# Patient Record
Sex: MALE | Race: WHITE | Employment: OTHER | URBAN - METROPOLITAN AREA
[De-identification: names, ages, dates, MRNs, and addresses within clinical notes are randomized per-mention and may not be internally consistent; named-entity substitution may affect disease eponyms.]

---

## 2017-01-06 ENCOUNTER — LAB REQUISITION (OUTPATIENT)
Dept: LAB | Facility: HOSPITAL | Age: 67
End: 2017-01-06
Attending: INTERNAL MEDICINE
Payer: MEDICARE

## 2017-01-06 ENCOUNTER — SNF VISIT (OUTPATIENT)
Dept: INTERNAL MEDICINE CLINIC | Age: 67
End: 2017-01-06

## 2017-01-06 VITALS
DIASTOLIC BLOOD PRESSURE: 66 MMHG | HEART RATE: 68 BPM | TEMPERATURE: 100 F | OXYGEN SATURATION: 96 % | SYSTOLIC BLOOD PRESSURE: 138 MMHG | RESPIRATION RATE: 18 BRPM

## 2017-01-06 DIAGNOSIS — B99.9 INFECTIOUS DISEASE: ICD-10-CM

## 2017-01-06 DIAGNOSIS — G89.29 OTHER CHRONIC PAIN: Primary | ICD-10-CM

## 2017-01-06 DIAGNOSIS — G44.019 EPISODIC CLUSTER HEADACHE, NOT INTRACTABLE: ICD-10-CM

## 2017-01-06 LAB
ALBUMIN SERPL-MCNC: 2.7 G/DL (ref 3.5–4.8)
ALP LIVER SERPL-CCNC: 107 U/L (ref 45–117)
ALT SERPL-CCNC: 18 U/L (ref 17–63)
AST SERPL-CCNC: 19 U/L (ref 15–41)
BASOPHILS # BLD AUTO: 0.08 X10(3) UL (ref 0–0.1)
BASOPHILS NFR BLD AUTO: 0.9 %
BILIRUB SERPL-MCNC: 0.3 MG/DL (ref 0.1–2)
BUN BLD-MCNC: 26 MG/DL (ref 8–20)
C-REACTIVE PROTEIN: 1.72 MG/DL (ref ?–1)
CALCIUM BLD-MCNC: 8.5 MG/DL (ref 8.3–10.3)
CHLORIDE: 106 MMOL/L (ref 101–111)
CO2: 27 MMOL/L (ref 22–32)
CREAT BLD-MCNC: 0.91 MG/DL (ref 0.7–1.3)
EOSINOPHIL # BLD AUTO: 0.17 X10(3) UL (ref 0–0.3)
EOSINOPHIL NFR BLD AUTO: 1.9 %
ERYTHROCYTE [DISTWIDTH] IN BLOOD BY AUTOMATED COUNT: 14.1 % (ref 11.5–16)
GLUCOSE BLD-MCNC: 108 MG/DL (ref 70–99)
HCT VFR BLD AUTO: 30.8 % (ref 37–53)
HGB BLD-MCNC: 9.7 G/DL (ref 13–17)
IMMATURE GRANULOCYTE COUNT: 0.02 X10(3) UL (ref 0–1)
IMMATURE GRANULOCYTE RATIO %: 0.2 %
LYMPHOCYTES # BLD AUTO: 1.62 X10(3) UL (ref 0.9–4)
LYMPHOCYTES NFR BLD AUTO: 18.3 %
M PROTEIN MFR SERPL ELPH: 6.7 G/DL (ref 6.1–8.3)
MCH RBC QN AUTO: 27.7 PG (ref 27–33.2)
MCHC RBC AUTO-ENTMCNC: 31.5 G/DL (ref 31–37)
MCV RBC AUTO: 88 FL (ref 80–99)
MONOCYTES # BLD AUTO: 0.97 X10(3) UL (ref 0.1–0.6)
MONOCYTES NFR BLD AUTO: 11 %
NEUTROPHIL ABS PRELIM: 5.98 X10 (3) UL (ref 1.3–6.7)
NEUTROPHILS # BLD AUTO: 5.98 X10(3) UL (ref 1.3–6.7)
NEUTROPHILS NFR BLD AUTO: 67.7 %
PLATELET # BLD AUTO: 188 10(3)UL (ref 150–450)
POTASSIUM SERPL-SCNC: 4.5 MMOL/L (ref 3.6–5.1)
RBC # BLD AUTO: 3.5 X10(6)UL (ref 3.8–5.8)
RED CELL DISTRIBUTION WIDTH-SD: 44.9 FL (ref 35.1–46.3)
SED RATE-ML: 55 MM/HR (ref 0–12)
SODIUM SERPL-SCNC: 140 MMOL/L (ref 136–144)
WBC # BLD AUTO: 8.8 X10(3) UL (ref 4–13)

## 2017-01-06 PROCEDURE — 86140 C-REACTIVE PROTEIN: CPT | Performed by: INTERNAL MEDICINE

## 2017-01-06 PROCEDURE — 85025 COMPLETE CBC W/AUTO DIFF WBC: CPT | Performed by: INTERNAL MEDICINE

## 2017-01-06 PROCEDURE — 80053 COMPREHEN METABOLIC PANEL: CPT | Performed by: INTERNAL MEDICINE

## 2017-01-06 PROCEDURE — 85652 RBC SED RATE AUTOMATED: CPT | Performed by: INTERNAL MEDICINE

## 2017-01-06 PROCEDURE — 99309 SBSQ NF CARE MODERATE MDM 30: CPT | Performed by: NURSE PRACTITIONER

## 2017-01-06 RX ORDER — ACETAMINOPHEN 500 MG
1000 TABLET ORAL 2 TIMES DAILY PRN
COMMUNITY

## 2017-01-06 NOTE — PROGRESS NOTES
Yonatanme 63   : 12/10/1950  Age 77year old  male patient is admitted to [address] [place] [address] and [facility] for [facility] date:  16  Discharge date to [facility]:  17  TOM:  Unsure at this time/as there is neck.  His blood pressure has been well controlled.     He denies cp/sob/miller  Has h/o lung cancer/no coughing/no O2 requirements    No c/o radiating leg pains today  Has SCS still in place with eventual plan to explant the SCS 2/2 the infection found on MRI INJECTION;  Surgeon: Thao Orellana MD;  Location: [facility] FOR PAIN MANAGEMENT    PATIENT [address] FOR IV ANTIBIOTIC SURGICAL SITE INFECTION PROPHYLAXIS.  5/28/2013    Comment Procedure: CERVICAL FACET INJECTION;  Sur[facility]esar [facility]ora [facility] FOR IV ANTIBIOTIC SURGICAL SITE INFECTION PROPHYLAXIS.   9/30/2013    Comment Procedure: CERVICAL RADIOFREQUENCY;  Surgeon: Sera Dowell MD;  Location: [address] [place] [address]    PATIENT DOCUMENTED NOT TO HAVE [facility] Full Code    ADVANCED CARE PLANNING TEAM: None      CURRENT MEDICATIONS     Current Outpatient Prescriptions:  sodium chloride 0.9 % SOLN 100 mL with Micafungin Sodium 50 MG SOLR 100 mg Inject 100 mg into the vein daily.  Disp: 4200 mg Rfl: 0   Sodium Chlor by mouth 2 (two) times daily as needed for Anxiety. Disp: 60 tablet Rfl: 3   Atorvastatin Calcium 40 MG Oral Tab Take 40 mg by mouth nightly. Disp:  Rfl:    Multiple Vitamins-Minerals (ALIVE MENS ENERGY) Oral Tab Take 1 tablet by mouth daily.  Disp:  Rfl: seizures, denies vertigo, denies tinnitus and denies tremors decreased sensation to BLE  PSYCHE: --- no sx of depression/+ anxiety  HEMATOLOGY:denies hx anemia, denies bruising, denies excessive bleeding on lovenox currently  ENDOCRINE: denies excessive th abuse/and over use of tylenol and opiate medications/was working with [facility] pain clinic: and had been weaned off meds/but with reoccurrence of back pain/new MRI done of LSPINE showed ?  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 micafungin 100mg daily  2. Weekly cbc/cmp/esr/crp  3. Dr. Clara Gandhi to follow at [facility] 12 and upon DC  4. F/u with spine team: for explantation of SCS  5.  Work with SW for possible dc home: patient can do own Anti fungal/will h

## 2017-01-10 ENCOUNTER — SNF DISCHARGE (OUTPATIENT)
Dept: INTERNAL MEDICINE CLINIC | Age: 67
End: 2017-01-10

## 2017-01-10 VITALS
RESPIRATION RATE: 20 BRPM | SYSTOLIC BLOOD PRESSURE: 115 MMHG | HEART RATE: 68 BPM | TEMPERATURE: 98 F | OXYGEN SATURATION: 98 % | DIASTOLIC BLOOD PRESSURE: 54 MMHG

## 2017-01-10 DIAGNOSIS — M46.44 DISCITIS OF THORACIC REGION: ICD-10-CM

## 2017-01-10 DIAGNOSIS — G44.019 EPISODIC CLUSTER HEADACHE, NOT INTRACTABLE: ICD-10-CM

## 2017-01-10 DIAGNOSIS — G89.29 OTHER CHRONIC PAIN: Primary | ICD-10-CM

## 2017-01-10 PROCEDURE — 99316 NF DSCHRG MGMT 30 MIN+: CPT | Performed by: NURSE PRACTITIONER

## 2017-01-10 NOTE — PROGRESS NOTES
Floyd Amato., 12/10/1950, 77year old, male is being discharged from [address] [place] [address] in [place]       DISCHARGE SUMMARY    Date of Admission:1/5/17    Date of Discharge:patient requesting to leave on 1/13/17                                 Admitting Diag bicep/site non tender/no erythema/no drainage/hooked to IV antifungal at visit time  SKIN: no rashes, no suspicious lesions, pale, warm, dry  WOUND: no wound to review  EYES: PERRLA, EOMI, sclera anicteric, conjunctiva normal; there is no nystagmus, no natacha 10/13/2015    01/06/2017   K 4.5 01/06/2017    01/06/2017   CO2 27.0 01/06/2017         Discharge Diagnoses w/ current management:  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 micafungin 100mg daily: IVPB  2. Weekly cbc/cmp/esr/crp  3.  Dr. Jong Neri to fo

## 2017-01-13 ENCOUNTER — LAB REQUISITION (OUTPATIENT)
Dept: LAB | Facility: HOSPITAL | Age: 67
End: 2017-01-13
Attending: INTERNAL MEDICINE
Payer: MEDICARE

## 2017-01-13 DIAGNOSIS — M54.50 LOW BACK PAIN: ICD-10-CM

## 2017-01-13 LAB
APTT PPP: 36.7 SECONDS (ref 25–34)
INR BLD: 1.08 (ref 0.89–1.12)
PSA SERPL DL<=0.01 NG/ML-MCNC: 14.3 SECONDS (ref 12.3–14.8)

## 2017-01-13 PROCEDURE — 85610 PROTHROMBIN TIME: CPT | Performed by: INTERNAL MEDICINE

## 2017-01-13 PROCEDURE — 36415 COLL VENOUS BLD VENIPUNCTURE: CPT | Performed by: INTERNAL MEDICINE

## 2017-01-13 PROCEDURE — 85730 THROMBOPLASTIN TIME PARTIAL: CPT | Performed by: INTERNAL MEDICINE

## 2017-01-19 RX ORDER — FLUCONAZOLE 200 MG/1
200 TABLET ORAL DAILY
COMMUNITY
End: 2017-07-26 | Stop reason: ALTCHOICE

## 2017-01-20 ENCOUNTER — ANESTHESIA EVENT (OUTPATIENT)
Dept: SURGERY | Facility: HOSPITAL | Age: 67
End: 2017-01-20
Payer: MEDICARE

## 2017-01-25 ENCOUNTER — SURGERY (OUTPATIENT)
Age: 67
End: 2017-01-25

## 2017-01-25 ENCOUNTER — APPOINTMENT (OUTPATIENT)
Dept: GENERAL RADIOLOGY | Facility: HOSPITAL | Age: 67
End: 2017-01-25
Attending: PAIN MEDICINE
Payer: MEDICARE

## 2017-01-25 ENCOUNTER — ANESTHESIA (OUTPATIENT)
Dept: SURGERY | Facility: HOSPITAL | Age: 67
End: 2017-01-25
Payer: MEDICARE

## 2017-01-25 ENCOUNTER — HOSPITAL ENCOUNTER (OUTPATIENT)
Facility: HOSPITAL | Age: 67
Setting detail: HOSPITAL OUTPATIENT SURGERY
Discharge: HOME | End: 2017-01-25
Attending: PAIN MEDICINE | Admitting: PAIN MEDICINE
Payer: MEDICARE

## 2017-01-25 VITALS
SYSTOLIC BLOOD PRESSURE: 107 MMHG | HEIGHT: 66 IN | TEMPERATURE: 98 F | DIASTOLIC BLOOD PRESSURE: 93 MMHG | BODY MASS INDEX: 22.5 KG/M2 | RESPIRATION RATE: 16 BRPM | HEART RATE: 80 BPM | OXYGEN SATURATION: 99 % | WEIGHT: 140 LBS

## 2017-01-25 PROCEDURE — 76000 FLUOROSCOPY <1 HR PHYS/QHP: CPT

## 2017-01-25 PROCEDURE — 00PU0MZ REMOVAL OF NEUROSTIMULATOR LEAD FROM SPINAL CANAL, OPEN APPROACH: ICD-10-PCS | Performed by: PAIN MEDICINE

## 2017-01-25 PROCEDURE — 0JPT0MZ REMOVAL OF STIMULATOR GENERATOR FROM TRUNK SUBCUTANEOUS TISSUE AND FASCIA, OPEN APPROACH: ICD-10-PCS | Performed by: PAIN MEDICINE

## 2017-01-25 RX ORDER — BUPIVACAINE HYDROCHLORIDE 5 MG/ML
INJECTION, SOLUTION EPIDURAL; INTRACAUDAL AS NEEDED
Status: DISCONTINUED | OUTPATIENT
Start: 2017-01-25 | End: 2017-01-25 | Stop reason: HOSPADM

## 2017-01-25 RX ORDER — HYDROMORPHONE HYDROCHLORIDE 1 MG/ML
0.4 INJECTION, SOLUTION INTRAMUSCULAR; INTRAVENOUS; SUBCUTANEOUS EVERY 5 MIN PRN
Status: DISCONTINUED | OUTPATIENT
Start: 2017-01-25 | End: 2017-01-25

## 2017-01-25 RX ORDER — HYDROCODONE BITARTRATE AND ACETAMINOPHEN 5; 325 MG/1; MG/1
1 TABLET ORAL AS NEEDED
Status: DISCONTINUED | OUTPATIENT
Start: 2017-01-25 | End: 2017-01-25

## 2017-01-25 RX ORDER — MIDAZOLAM HYDROCHLORIDE 1 MG/ML
1 INJECTION INTRAMUSCULAR; INTRAVENOUS EVERY 5 MIN PRN
Status: DISCONTINUED | OUTPATIENT
Start: 2017-01-25 | End: 2017-01-25

## 2017-01-25 RX ORDER — TRAMADOL HYDROCHLORIDE 50 MG/1
100 TABLET ORAL EVERY 6 HOURS PRN
Status: CANCELLED | OUTPATIENT
Start: 2017-01-25

## 2017-01-25 RX ORDER — HYDROCODONE BITARTRATE AND ACETAMINOPHEN 5; 325 MG/1; MG/1
2 TABLET ORAL AS NEEDED
Status: DISCONTINUED | OUTPATIENT
Start: 2017-01-25 | End: 2017-01-25

## 2017-01-25 RX ORDER — ONDANSETRON 2 MG/ML
4 INJECTION INTRAMUSCULAR; INTRAVENOUS AS NEEDED
Status: DISCONTINUED | OUTPATIENT
Start: 2017-01-25 | End: 2017-01-25

## 2017-01-25 RX ORDER — LIDOCAINE HYDROCHLORIDE AND EPINEPHRINE 10; 10 MG/ML; UG/ML
INJECTION, SOLUTION INFILTRATION; PERINEURAL AS NEEDED
Status: DISCONTINUED | OUTPATIENT
Start: 2017-01-25 | End: 2017-01-25 | Stop reason: HOSPADM

## 2017-01-25 RX ORDER — NALOXONE HYDROCHLORIDE 0.4 MG/ML
80 INJECTION, SOLUTION INTRAMUSCULAR; INTRAVENOUS; SUBCUTANEOUS AS NEEDED
Status: DISCONTINUED | OUTPATIENT
Start: 2017-01-25 | End: 2017-01-25

## 2017-01-25 RX ORDER — SODIUM CHLORIDE, SODIUM LACTATE, POTASSIUM CHLORIDE, CALCIUM CHLORIDE 600; 310; 30; 20 MG/100ML; MG/100ML; MG/100ML; MG/100ML
INJECTION, SOLUTION INTRAVENOUS CONTINUOUS
Status: DISCONTINUED | OUTPATIENT
Start: 2017-01-25 | End: 2017-01-25

## 2017-01-25 NOTE — OPERATIVE REPORT
PATIENT NAME: Nikolai Hurley   : 12/10/1950  MRN: [id number]   DATE OF OPERATION: 2017      OPERATIVE REPORT   PREOPERATIVE DIAGNOSIS:   1. Chronic pain syndrome. 2. Post laminectomy syndrome. 3. Chronic lumbar radiculitis.    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 The incisions were dressed with Mastisol, Steri-Strips, sterile 4 x 4, and Tegaderm. The patient was brought to recovery room in stable condition. The patient tolerated the procedure well.

## 2017-01-25 NOTE — ANESTHESIA PREPROCEDURE EVALUATION
PRE-OP EVALUATION    Patient Name: Kenn Laws.     Pre-op Diagnosis: IDEOPATHIC PROGRESSIVE NEUROPATHY    Procedure(s):  SPINAL CORD STIMULATOR ELECTRODE AND BATTERY EXPLANT    Surgeon(s) and Role:     Kaitlin Lechuga MD - Primary    Pre-op vitals re TATYANA (15) on 11/25/2016 3:10:27 PM      Last ECHO  Summary:      1. Left ventricle: The cavity size is mildly increased. There is mild      concentric hypertrophy. Systolic function is moderately reduced.  The      estimated ejection fraction is 35-40%, lik Location: [facility] FOR PAIN MANAGEMENT    M-SEDAJ BY  PHYS [address] .[facility] 59  N [facility] 5+ YR  5/28/2013    Comment Procedure: CERVICAL FACET INJECTION;  Surgeon: Na Ozuna MD;  Location: [facility] FOR PAIN MANAGEMENT    PATIENT [facility] Comment Procedure: CERVICAL RADIOFREQUENCY;  Surgeon: Sera Dowell MD;  Location: [facility] FOR PAIN MANAGEMENT    PATIENT [address] FOR IV ANTIBIOTIC SURGICAL SITE INFECTION PROPHYLAXIS.   9/30/2013    Comment Procedure: CERVICAL RADI 01/06/2017   * 01/06/2017   CA 8.5 01/06/2017       Lab Results  Component Value Date   INR 1.08 01/13/2017         Airway      Mallampati: II  Mouth opening: 3 FB  TM distance: 4 - 6 cm  Neck ROM: full Cardiovascular    Cardiovascular exam normal.

## 2017-01-25 NOTE — ANESTHESIA POSTPROCEDURE EVALUATION
[facility].  Patient Status:  Hospital Outpatient Surgery   Age/Gender 77year old male MRN [id number]   [facility] SURGERY Attending Tamela Lechuga MD   Hosp Day # 0 PCP Enoch Neely MD       Anesthesia Post-op No

## 2017-01-30 PROBLEM — M47.816 LUMBAR SPONDYLOSIS: Status: ACTIVE | Noted: 2017-01-30

## 2017-02-22 ENCOUNTER — APPOINTMENT (OUTPATIENT)
Dept: CT IMAGING | Facility: HOSPITAL | Age: 67
End: 2017-02-22
Attending: EMERGENCY MEDICINE
Payer: MEDICARE

## 2017-02-22 ENCOUNTER — HOSPITAL ENCOUNTER (EMERGENCY)
Facility: HOSPITAL | Age: 67
Discharge: HOME | End: 2017-02-22
Attending: EMERGENCY MEDICINE
Payer: MEDICARE

## 2017-02-22 VITALS
HEIGHT: 66 IN | SYSTOLIC BLOOD PRESSURE: 124 MMHG | TEMPERATURE: 99 F | HEART RATE: 76 BPM | BODY MASS INDEX: 20.38 KG/M2 | WEIGHT: 126.81 LBS | RESPIRATION RATE: 18 BRPM | DIASTOLIC BLOOD PRESSURE: 78 MMHG | OXYGEN SATURATION: 100 %

## 2017-02-22 DIAGNOSIS — E86.0 DEHYDRATION: ICD-10-CM

## 2017-02-22 DIAGNOSIS — R42 DIZZINESS: Primary | ICD-10-CM

## 2017-02-22 LAB
ALBUMIN SERPL-MCNC: 3.4 G/DL (ref 3.5–4.8)
ALP LIVER SERPL-CCNC: 96 U/L (ref 45–117)
ALT SERPL-CCNC: 15 U/L (ref 17–63)
AST SERPL-CCNC: 16 U/L (ref 15–41)
BASOPHILS # BLD AUTO: 0.05 X10(3) UL (ref 0–0.1)
BASOPHILS NFR BLD AUTO: 0.7 %
BILIRUB SERPL-MCNC: 0.3 MG/DL (ref 0.1–2)
BILIRUB UR QL STRIP.AUTO: NEGATIVE
BUN BLD-MCNC: 27 MG/DL (ref 8–20)
CALCIUM BLD-MCNC: 9 MG/DL (ref 8.3–10.3)
CHLORIDE: 106 MMOL/L (ref 101–111)
CLARITY UR REFRACT.AUTO: CLEAR
CO2: 28 MMOL/L (ref 22–32)
CREAT BLD-MCNC: 1.39 MG/DL (ref 0.7–1.3)
EOSINOPHIL # BLD AUTO: 0.16 X10(3) UL (ref 0–0.3)
EOSINOPHIL NFR BLD AUTO: 2.2 %
ERYTHROCYTE [DISTWIDTH] IN BLOOD BY AUTOMATED COUNT: 15.4 % (ref 11.5–16)
GLUCOSE BLD-MCNC: 95 MG/DL (ref 70–99)
GLUCOSE BLD-MCNC: 97 MG/DL (ref 65–99)
GLUCOSE UR STRIP.AUTO-MCNC: NEGATIVE MG/DL
HCT VFR BLD AUTO: 40 % (ref 37–53)
HGB BLD-MCNC: 12.5 G/DL (ref 13–17)
IMMATURE GRANULOCYTE COUNT: 0.01 X10(3) UL (ref 0–1)
IMMATURE GRANULOCYTE RATIO %: 0.1 %
KETONES UR STRIP.AUTO-MCNC: NEGATIVE MG/DL
LEUKOCYTE ESTERASE UR QL STRIP.AUTO: NEGATIVE
LYMPHOCYTES # BLD AUTO: 2.23 X10(3) UL (ref 0.9–4)
LYMPHOCYTES NFR BLD AUTO: 30.4 %
M PROTEIN MFR SERPL ELPH: 7.2 G/DL (ref 6.1–8.3)
MCH RBC QN AUTO: 27.4 PG (ref 27–33.2)
MCHC RBC AUTO-ENTMCNC: 31.3 G/DL (ref 31–37)
MCV RBC AUTO: 87.7 FL (ref 80–99)
MONOCYTES # BLD AUTO: 0.61 X10(3) UL (ref 0.1–0.6)
MONOCYTES NFR BLD AUTO: 8.3 %
NEUTROPHIL ABS PRELIM: 4.27 X10 (3) UL (ref 1.3–6.7)
NEUTROPHILS # BLD AUTO: 4.27 X10(3) UL (ref 1.3–6.7)
NEUTROPHILS NFR BLD AUTO: 58.3 %
NITRITE UR QL STRIP.AUTO: NEGATIVE
PH UR STRIP.AUTO: 6 [PH] (ref 4.5–8)
PLATELET # BLD AUTO: 167 10(3)UL (ref 150–450)
POTASSIUM SERPL-SCNC: 4.2 MMOL/L (ref 3.6–5.1)
PROT UR STRIP.AUTO-MCNC: NEGATIVE MG/DL
RBC # BLD AUTO: 4.56 X10(6)UL (ref 3.8–5.8)
RBC UR QL AUTO: NEGATIVE
RED CELL DISTRIBUTION WIDTH-SD: 48.6 FL (ref 35.1–46.3)
SODIUM SERPL-SCNC: 140 MMOL/L (ref 136–144)
SP GR UR STRIP.AUTO: 1.01 (ref 1–1.03)
UROBILINOGEN UR STRIP.AUTO-MCNC: <2 MG/DL
WBC # BLD AUTO: 7.3 X10(3) UL (ref 4–13)

## 2017-02-22 PROCEDURE — 82962 GLUCOSE BLOOD TEST: CPT

## 2017-02-22 PROCEDURE — 80053 COMPREHEN METABOLIC PANEL: CPT

## 2017-02-22 PROCEDURE — 93010 ELECTROCARDIOGRAM REPORT: CPT

## 2017-02-22 PROCEDURE — 96360 HYDRATION IV INFUSION INIT: CPT

## 2017-02-22 PROCEDURE — 85025 COMPLETE CBC W/AUTO DIFF WBC: CPT

## 2017-02-22 PROCEDURE — 81003 URINALYSIS AUTO W/O SCOPE: CPT | Performed by: EMERGENCY MEDICINE

## 2017-02-22 PROCEDURE — 93005 ELECTROCARDIOGRAM TRACING: CPT

## 2017-02-22 PROCEDURE — 96361 HYDRATE IV INFUSION ADD-ON: CPT

## 2017-02-22 PROCEDURE — 99284 EMERGENCY DEPT VISIT MOD MDM: CPT

## 2017-02-22 PROCEDURE — 70450 CT HEAD/BRAIN W/O DYE: CPT

## 2017-02-22 PROCEDURE — 99285 EMERGENCY DEPT VISIT HI MDM: CPT

## 2017-02-22 RX ORDER — SODIUM CHLORIDE 9 MG/ML
INJECTION, SOLUTION INTRAVENOUS ONCE
Status: COMPLETED | OUTPATIENT
Start: 2017-02-22 | End: 2017-02-22

## 2017-02-22 NOTE — ED INITIAL ASSESSMENT (HPI)
Pt was at pmd's office and stated he felt lightheaded and eyes were feeling funny, has chronic foot pain and back pain

## 2017-02-23 LAB
ATRIAL RATE: 80 BPM
P AXIS: 56 DEGREES
P-R INTERVAL: 182 MS
Q-T INTERVAL: 390 MS
QRS DURATION: 86 MS
QTC CALCULATION (BEZET): 449 MS
R AXIS: -32 DEGREES
T AXIS: 84 DEGREES
VENTRICULAR RATE: 80 BPM

## 2017-02-23 NOTE — ED PROVIDER NOTES
Patient Seen in: [facility] Emergency Department    History   Patient presents with:  Dizziness (neurologic)    Stated Complaint: Lightheaded, dizziness    HPI    79-year-old male presents emergency room with chief complaint of episode of dizziness. 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   6/14/2013    Comment Procedure: CERVICAL FACET INJECTION;  Surgeon: Gracy Elliott MD;  Location: [facility] FOR PAIN MANAGEMENT    PATIENT DOCUMENTED NOT TO HAVE EXPERIENCED ANY OF THE FOLLOWING EVENTS  6/14/2013    Comment Procedure: [facility]    TONSILLECTOMY      COLONOSCOPY         Medications :   lisinopril 10 MG Oral Tab,  Take 1 tablet (10 mg total) by mouth daily. alprazolam 0.5 MG Oral Tab,  Take 1 tablet (0.5 mg total) by mouth daily.    TraMADol HCl 50 M Current Every Day Smoker        Packs/Day: 0.40  Years: 35        Types: Cigarettes    Smokeless Status: Never Used                        Alcohol Use: No                 Comment: h/o ETOH abuse--none since 1992      Review of Systems    Positive for state midline, no facial drooping, no ptosis, muscle strength +5/5 bilateral upper and lower extremities cerebellar finger to nose intact, no pronator drift, sensation intact.   Steady gait with walker        ED Course     Labs Reviewed   COMP METABOLIC PANEL (14 was clinically dehydrated and did receive IV fluids, and reevaluation mucous murmurs are moist.  Patient does have slightly elevated creatinine was instructed to follow-up with primary care for reevaluation.   Return to ER if any change worsening symptoms a

## 2017-03-23 PROBLEM — M47.816 LUMBAR SPONDYLOSIS: Status: RESOLVED | Noted: 2017-01-30 | Resolved: 2017-03-23

## 2017-03-31 PROBLEM — J84.10 PULMONARY FIBROSIS (HCC): Status: ACTIVE | Noted: 2017-03-31

## 2017-03-31 PROBLEM — I70.8 AORTO-ILIAC ATHEROSCLEROSIS (HCC): Status: ACTIVE | Noted: 2017-03-31

## 2017-03-31 PROBLEM — I70.0 AORTO-ILIAC ATHEROSCLEROSIS (HCC): Status: ACTIVE | Noted: 2017-03-31

## 2017-03-31 PROBLEM — I77.819 AORTIC ECTASIA (HCC): Status: ACTIVE | Noted: 2017-03-31

## 2017-04-19 PROCEDURE — 86803 HEPATITIS C AB TEST: CPT | Performed by: INTERNAL MEDICINE

## 2018-02-14 PROBLEM — I26.99 OTHER PULMONARY EMBOLISM WITHOUT ACUTE COR PULMONALE, UNSPECIFIED CHRONICITY (HCC): Status: ACTIVE | Noted: 2018-02-14

## 2018-02-14 PROBLEM — I48.0 PAF (PAROXYSMAL ATRIAL FIBRILLATION) (HCC): Status: ACTIVE | Noted: 2018-02-14

## 2018-02-14 PROBLEM — E08.40: Status: RESOLVED | Noted: 2018-02-14 | Resolved: 2018-02-14

## 2018-02-14 PROBLEM — E08.40: Status: ACTIVE | Noted: 2018-02-14

## 2018-02-14 PROBLEM — J44.9 CHRONIC OBSTRUCTIVE PULMONARY DISEASE, UNSPECIFIED COPD TYPE (HCC): Status: ACTIVE | Noted: 2018-02-14

## 2018-03-02 PROBLEM — I50.22 CHRONIC SYSTOLIC CHF (CONGESTIVE HEART FAILURE) (HCC): Status: ACTIVE | Noted: 2018-03-02

## 2018-03-02 PROBLEM — F10.20 ALCOHOLIC (HCC): Status: ACTIVE | Noted: 2018-03-02

## 2018-03-02 PROBLEM — R09.89 BILATERAL CAROTID BRUITS: Status: ACTIVE | Noted: 2018-03-02

## 2018-03-07 PROBLEM — M54.12 CERVICAL RADICULITIS: Status: ACTIVE | Noted: 2018-03-07

## 2018-04-02 PROBLEM — I77.9 BILATERAL CAROTID ARTERY DISEASE (HCC): Status: ACTIVE | Noted: 2018-04-02

## 2018-04-02 PROCEDURE — 36415 COLL VENOUS BLD VENIPUNCTURE: CPT | Performed by: INTERNAL MEDICINE

## 2018-04-02 PROCEDURE — 82607 VITAMIN B-12: CPT | Performed by: INTERNAL MEDICINE

## 2018-05-08 ENCOUNTER — ANESTHESIA (OUTPATIENT)
Dept: SURGERY | Facility: HOSPITAL | Age: 68
End: 2018-05-08

## 2018-05-08 ENCOUNTER — SURGERY (OUTPATIENT)
Age: 68
End: 2018-05-08

## 2018-05-08 ENCOUNTER — APPOINTMENT (OUTPATIENT)
Dept: CT IMAGING | Facility: HOSPITAL | Age: 68
DRG: 853 | End: 2018-05-08
Attending: EMERGENCY MEDICINE
Payer: MEDICARE

## 2018-05-08 ENCOUNTER — HOSPITAL ENCOUNTER (INPATIENT)
Facility: HOSPITAL | Age: 68
LOS: 7 days | Discharge: HOME HEALTH | DRG: 853 | End: 2018-05-15
Attending: EMERGENCY MEDICINE | Admitting: SURGERY
Payer: MEDICARE

## 2018-05-08 ENCOUNTER — ANESTHESIA EVENT (OUTPATIENT)
Dept: SURGERY | Facility: HOSPITAL | Age: 68
End: 2018-05-08

## 2018-05-08 ENCOUNTER — APPOINTMENT (OUTPATIENT)
Dept: GENERAL RADIOLOGY | Facility: HOSPITAL | Age: 68
DRG: 853 | End: 2018-05-08
Attending: EMERGENCY MEDICINE
Payer: MEDICARE

## 2018-05-08 DIAGNOSIS — R19.8 PERFORATED VISCUS: Primary | ICD-10-CM

## 2018-05-08 DIAGNOSIS — K25.5 PERFORATED STOMACH (HCC): ICD-10-CM

## 2018-05-08 PROBLEM — N17.9 ACUTE KIDNEY INJURY (HCC): Status: ACTIVE | Noted: 2018-05-08

## 2018-05-08 PROBLEM — R73.9 HYPERGLYCEMIA: Status: ACTIVE | Noted: 2018-05-08

## 2018-05-08 PROBLEM — D72.829 LEUKOCYTOSIS: Status: ACTIVE | Noted: 2018-05-08

## 2018-05-08 PROCEDURE — 93005 ELECTROCARDIOGRAM TRACING: CPT

## 2018-05-08 PROCEDURE — 84132 ASSAY OF SERUM POTASSIUM: CPT | Performed by: NURSE PRACTITIONER

## 2018-05-08 PROCEDURE — 80053 COMPREHEN METABOLIC PANEL: CPT | Performed by: EMERGENCY MEDICINE

## 2018-05-08 PROCEDURE — 96361 HYDRATE IV INFUSION ADD-ON: CPT

## 2018-05-08 PROCEDURE — 87081 CULTURE SCREEN ONLY: CPT | Performed by: HOSPITALIST

## 2018-05-08 PROCEDURE — 87205 SMEAR GRAM STAIN: CPT | Performed by: SURGERY

## 2018-05-08 PROCEDURE — 86900 BLOOD TYPING SEROLOGIC ABO: CPT | Performed by: EMERGENCY MEDICINE

## 2018-05-08 PROCEDURE — 0DU607Z SUPPLEMENT STOMACH WITH AUTOLOGOUS TISSUE SUBSTITUTE, OPEN APPROACH: ICD-10-PCS | Performed by: SURGERY

## 2018-05-08 PROCEDURE — 87147 CULTURE TYPE IMMUNOLOGIC: CPT | Performed by: SURGERY

## 2018-05-08 PROCEDURE — 85610 PROTHROMBIN TIME: CPT | Performed by: EMERGENCY MEDICINE

## 2018-05-08 PROCEDURE — 86901 BLOOD TYPING SEROLOGIC RH(D): CPT | Performed by: EMERGENCY MEDICINE

## 2018-05-08 PROCEDURE — 87075 CULTR BACTERIA EXCEPT BLOOD: CPT | Performed by: SURGERY

## 2018-05-08 PROCEDURE — 86850 RBC ANTIBODY SCREEN: CPT | Performed by: EMERGENCY MEDICINE

## 2018-05-08 PROCEDURE — 81001 URINALYSIS AUTO W/SCOPE: CPT | Performed by: EMERGENCY MEDICINE

## 2018-05-08 PROCEDURE — 87186 SC STD MICRODIL/AGAR DIL: CPT | Performed by: SURGERY

## 2018-05-08 PROCEDURE — 85730 THROMBOPLASTIN TIME PARTIAL: CPT | Performed by: EMERGENCY MEDICINE

## 2018-05-08 PROCEDURE — 99285 EMERGENCY DEPT VISIT HI MDM: CPT

## 2018-05-08 PROCEDURE — 85025 COMPLETE CBC W/AUTO DIFF WBC: CPT | Performed by: EMERGENCY MEDICINE

## 2018-05-08 PROCEDURE — 99291 CRITICAL CARE FIRST HOUR: CPT

## 2018-05-08 PROCEDURE — 3E0T3BZ INTRODUCTION OF ANESTHETIC AGENT INTO PERIPHERAL NERVES AND PLEXI, PERCUTANEOUS APPROACH: ICD-10-PCS | Performed by: SURGERY

## 2018-05-08 PROCEDURE — 83605 ASSAY OF LACTIC ACID: CPT | Performed by: NURSE PRACTITIONER

## 2018-05-08 PROCEDURE — 83605 ASSAY OF LACTIC ACID: CPT | Performed by: EMERGENCY MEDICINE

## 2018-05-08 PROCEDURE — 87077 CULTURE AEROBIC IDENTIFY: CPT | Performed by: SURGERY

## 2018-05-08 PROCEDURE — 96376 TX/PRO/DX INJ SAME DRUG ADON: CPT

## 2018-05-08 PROCEDURE — 71045 X-RAY EXAM CHEST 1 VIEW: CPT | Performed by: EMERGENCY MEDICINE

## 2018-05-08 PROCEDURE — S0028 INJECTION, FAMOTIDINE, 20 MG: HCPCS | Performed by: SURGERY

## 2018-05-08 PROCEDURE — 96375 TX/PRO/DX INJ NEW DRUG ADDON: CPT

## 2018-05-08 PROCEDURE — 93010 ELECTROCARDIOGRAM REPORT: CPT

## 2018-05-08 PROCEDURE — 82962 GLUCOSE BLOOD TEST: CPT

## 2018-05-08 PROCEDURE — 83690 ASSAY OF LIPASE: CPT | Performed by: EMERGENCY MEDICINE

## 2018-05-08 PROCEDURE — 85027 COMPLETE CBC AUTOMATED: CPT | Performed by: NURSE PRACTITIONER

## 2018-05-08 PROCEDURE — 74176 CT ABD & PELVIS W/O CONTRAST: CPT | Performed by: EMERGENCY MEDICINE

## 2018-05-08 PROCEDURE — 87070 CULTURE OTHR SPECIMN AEROBIC: CPT | Performed by: SURGERY

## 2018-05-08 PROCEDURE — 96365 THER/PROPH/DIAG IV INF INIT: CPT

## 2018-05-08 PROCEDURE — 93010 ELECTROCARDIOGRAM REPORT: CPT | Performed by: INTERNAL MEDICINE

## 2018-05-08 RX ORDER — NICOTINE 21 MG/24HR
1 PATCH, TRANSDERMAL 24 HOURS TRANSDERMAL DAILY
Status: DISCONTINUED | OUTPATIENT
Start: 2018-05-08 | End: 2018-05-15

## 2018-05-08 RX ORDER — ONDANSETRON 2 MG/ML
4 INJECTION INTRAMUSCULAR; INTRAVENOUS EVERY 6 HOURS PRN
Status: DISCONTINUED | OUTPATIENT
Start: 2018-05-08 | End: 2018-05-15

## 2018-05-08 RX ORDER — HYDROMORPHONE HYDROCHLORIDE 1 MG/ML
0.2 INJECTION, SOLUTION INTRAMUSCULAR; INTRAVENOUS; SUBCUTANEOUS EVERY 2 HOUR PRN
Status: DISCONTINUED | OUTPATIENT
Start: 2018-05-08 | End: 2018-05-15

## 2018-05-08 RX ORDER — SODIUM CHLORIDE, SODIUM LACTATE, POTASSIUM CHLORIDE, CALCIUM CHLORIDE 600; 310; 30; 20 MG/100ML; MG/100ML; MG/100ML; MG/100ML
INJECTION, SOLUTION INTRAVENOUS CONTINUOUS
Status: DISCONTINUED | OUTPATIENT
Start: 2018-05-08 | End: 2018-05-08 | Stop reason: HOSPADM

## 2018-05-08 RX ORDER — NALOXONE HYDROCHLORIDE 0.4 MG/ML
80 INJECTION, SOLUTION INTRAMUSCULAR; INTRAVENOUS; SUBCUTANEOUS AS NEEDED
Status: DISCONTINUED | OUTPATIENT
Start: 2018-05-08 | End: 2018-05-08 | Stop reason: HOSPADM

## 2018-05-08 RX ORDER — ONDANSETRON 2 MG/ML
4 INJECTION INTRAMUSCULAR; INTRAVENOUS ONCE
Status: COMPLETED | OUTPATIENT
Start: 2018-05-08 | End: 2018-05-08

## 2018-05-08 RX ORDER — SODIUM CHLORIDE 9 MG/ML
1000 INJECTION, SOLUTION INTRAVENOUS CONTINUOUS
Status: DISCONTINUED | OUTPATIENT
Start: 2018-05-08 | End: 2018-05-08

## 2018-05-08 RX ORDER — LIDOCAINE HYDROCHLORIDE AND EPINEPHRINE 10; 10 MG/ML; UG/ML
INJECTION, SOLUTION INFILTRATION; PERINEURAL AS NEEDED
Status: DISCONTINUED | OUTPATIENT
Start: 2018-05-08 | End: 2018-05-08 | Stop reason: HOSPADM

## 2018-05-08 RX ORDER — DEXTROSE, SODIUM CHLORIDE, SODIUM LACTATE, POTASSIUM CHLORIDE, AND CALCIUM CHLORIDE 5; .6; .31; .03; .02 G/100ML; G/100ML; G/100ML; G/100ML; G/100ML
INJECTION, SOLUTION INTRAVENOUS CONTINUOUS
Status: DISCONTINUED | OUTPATIENT
Start: 2018-05-08 | End: 2018-05-08

## 2018-05-08 RX ORDER — DEXTROSE MONOHYDRATE 25 G/50ML
50 INJECTION, SOLUTION INTRAVENOUS ONCE
Status: COMPLETED | OUTPATIENT
Start: 2018-05-08 | End: 2018-05-08

## 2018-05-08 RX ORDER — DEXTROSE, SODIUM CHLORIDE, SODIUM LACTATE, POTASSIUM CHLORIDE, AND CALCIUM CHLORIDE 5; .6; .31; .03; .02 G/100ML; G/100ML; G/100ML; G/100ML; G/100ML
INJECTION, SOLUTION INTRAVENOUS CONTINUOUS
Status: DISCONTINUED | OUTPATIENT
Start: 2018-05-08 | End: 2018-05-13

## 2018-05-08 RX ORDER — DEXTROSE MONOHYDRATE 100 MG/ML
INJECTION, SOLUTION INTRAVENOUS CONTINUOUS
Status: DISCONTINUED | OUTPATIENT
Start: 2018-05-08 | End: 2018-05-13

## 2018-05-08 RX ORDER — CEFOXITIN 2 G/1
INJECTION, POWDER, FOR SOLUTION INTRAVENOUS
Status: DISCONTINUED | OUTPATIENT
Start: 2018-05-08 | End: 2018-05-08 | Stop reason: HOSPADM

## 2018-05-08 RX ORDER — FAMOTIDINE 10 MG/ML
20 INJECTION, SOLUTION INTRAVENOUS DAILY
Status: DISCONTINUED | OUTPATIENT
Start: 2018-05-08 | End: 2018-05-10

## 2018-05-08 RX ORDER — HYDROMORPHONE HYDROCHLORIDE 1 MG/ML
1 INJECTION, SOLUTION INTRAMUSCULAR; INTRAVENOUS; SUBCUTANEOUS EVERY 30 MIN PRN
Status: DISCONTINUED | OUTPATIENT
Start: 2018-05-08 | End: 2018-05-15

## 2018-05-08 RX ORDER — HYDROMORPHONE HYDROCHLORIDE 1 MG/ML
0.8 INJECTION, SOLUTION INTRAMUSCULAR; INTRAVENOUS; SUBCUTANEOUS EVERY 2 HOUR PRN
Status: DISCONTINUED | OUTPATIENT
Start: 2018-05-08 | End: 2018-05-15

## 2018-05-08 RX ORDER — BUPIVACAINE HYDROCHLORIDE 5 MG/ML
INJECTION, SOLUTION EPIDURAL; INTRACAUDAL AS NEEDED
Status: DISCONTINUED | OUTPATIENT
Start: 2018-05-08 | End: 2018-05-08 | Stop reason: HOSPADM

## 2018-05-08 RX ORDER — DEXTROSE MONOHYDRATE 25 G/50ML
50 INJECTION, SOLUTION INTRAVENOUS
Status: DISCONTINUED | OUTPATIENT
Start: 2018-05-08 | End: 2018-05-15

## 2018-05-08 RX ORDER — ONDANSETRON 2 MG/ML
4 INJECTION INTRAMUSCULAR; INTRAVENOUS AS NEEDED
Status: DISCONTINUED | OUTPATIENT
Start: 2018-05-08 | End: 2018-05-08 | Stop reason: HOSPADM

## 2018-05-08 RX ORDER — IPRATROPIUM BROMIDE AND ALBUTEROL SULFATE 2.5; .5 MG/3ML; MG/3ML
3 SOLUTION RESPIRATORY (INHALATION) EVERY 6 HOURS PRN
Status: DISCONTINUED | OUTPATIENT
Start: 2018-05-08 | End: 2018-05-15

## 2018-05-08 RX ORDER — MIDAZOLAM HYDROCHLORIDE 1 MG/ML
1 INJECTION INTRAMUSCULAR; INTRAVENOUS EVERY 5 MIN PRN
Status: DISCONTINUED | OUTPATIENT
Start: 2018-05-08 | End: 2018-05-08 | Stop reason: HOSPADM

## 2018-05-08 RX ORDER — HYDROMORPHONE HYDROCHLORIDE 1 MG/ML
0.4 INJECTION, SOLUTION INTRAMUSCULAR; INTRAVENOUS; SUBCUTANEOUS EVERY 5 MIN PRN
Status: DISCONTINUED | OUTPATIENT
Start: 2018-05-08 | End: 2018-05-08 | Stop reason: HOSPADM

## 2018-05-08 RX ORDER — FAMOTIDINE 20 MG/1
20 TABLET ORAL DAILY
Status: DISCONTINUED | OUTPATIENT
Start: 2018-05-08 | End: 2018-05-10

## 2018-05-08 RX ORDER — HEPARIN SODIUM 5000 [USP'U]/ML
5000 INJECTION, SOLUTION INTRAVENOUS; SUBCUTANEOUS EVERY 12 HOURS SCHEDULED
Status: DISCONTINUED | OUTPATIENT
Start: 2018-05-09 | End: 2018-05-15

## 2018-05-08 RX ORDER — DEXTROSE MONOHYDRATE 25 G/50ML
INJECTION, SOLUTION INTRAVENOUS
Status: COMPLETED
Start: 2018-05-08 | End: 2018-05-08

## 2018-05-08 RX ORDER — MEPERIDINE HYDROCHLORIDE 25 MG/ML
12.5 INJECTION INTRAMUSCULAR; INTRAVENOUS; SUBCUTANEOUS AS NEEDED
Status: DISCONTINUED | OUTPATIENT
Start: 2018-05-08 | End: 2018-05-08 | Stop reason: HOSPADM

## 2018-05-08 RX ORDER — DEXTROSE MONOHYDRATE 25 G/50ML
50 INJECTION, SOLUTION INTRAVENOUS
Status: DISCONTINUED | OUTPATIENT
Start: 2018-05-08 | End: 2018-05-08 | Stop reason: HOSPADM

## 2018-05-08 RX ORDER — HYDROMORPHONE HYDROCHLORIDE 1 MG/ML
0.4 INJECTION, SOLUTION INTRAMUSCULAR; INTRAVENOUS; SUBCUTANEOUS EVERY 2 HOUR PRN
Status: DISCONTINUED | OUTPATIENT
Start: 2018-05-08 | End: 2018-05-15

## 2018-05-08 NOTE — PROGRESS NOTES
05/08/18 1753   Clinical Encounter Type   Visited With Patient; Health care provider  (RN)   Routine Visit Introduction   Continue Visiting No   Patient's Supportive Strategies/Resources ( acknowledged patient's situation regarding HCPOA.)   Jen

## 2018-05-08 NOTE — ED PROVIDER NOTES
Patient Seen in: [facility] Emergency Department    History   Patient presents with:  Abdomen/Flank Pain (GI/)    Stated Complaint: arrived via EMS for c/o abd pain with n/v/d    HPI    Patient is a 42-year-old male comes in emergency room for mark PAIN MANAGEMENT  9/30/2013: DESTROY CERV/THOR FACET JNT      Comment: Procedure: CERVICAL RADIOFREQUENCY;  Surgeon:                Sera Dowell MD;  Location: [address] [place] [address] Drive MANAGEMENT  5/28/2013: Fercho HERNANDEZ/ Comment: Procedure: CERVICAL FACET INJECTION;  Surgeon:               Henrry Llamas MD;  Location: [place] [address] MANAGEMENT  6/14/2013: PATIENT DOCUMENTED NOT TO HAVE EXPERIENCED ANY*      Comment: Procedure: CERVICAL FACET INJECTION 1992      Review of Systems    Positive for stated complaint: arrived via EMS for c/o abd pain with n/v/d  Other systems are as noted in HPI. Constitutional and vital signs reviewed. All other systems reviewed and negative except as noted above.     P All other components within normal limits   CBC W/ DIFFERENTIAL - Abnormal; Notable for the following:     WBC 17.3 (*)     Neutrophil Absolute Prelim 15.83 (*)     Neutrophil Absolute 15.83 (*)     Lymphocyte Absolute 0.55 (*)     All other components pooling subjacent to the anterior abdominal wall, likely within the greater sac. Perforation is felt to most likely be arising from the anterior, inferior margin of the stomach along the greater curvature. Would recommend emergent surgical consultation. Obedchrista  medical group hospitalist        [facility]      Sepsis Reassessment Note    /59   Pulse 77   Temp 97.7 °F (36.5 °C) (Temporal)   Resp 16   Ht 167.6 cm (5' 6\")   Wt 63.5 kg   SpO2 100%   BMI 22.60 kg/m²      6:32 AM    [place]

## 2018-05-08 NOTE — H&P
History & Physical Examination    Patient Name: Lion Song.   MRN: [id number]  [facility]: [id number]  YOB: 1950    Diagnosis: perforation ,peritnoitis        Prescriptions Prior to Admission:  lisinopril 10 MG Oral Tab TAKE 1 TABLET (10 MG TOTA Allergies:   No Known Allergies          Past Medical History:   Diagnosis Date   • Anxiety state, unspecified    • Blind     right eye   • Cancer ([facility] 6/2015    right lung cancer-- resected at [facility]   • Congestive heart disease ([facility]     per raisa PAIN MANAGEMENT  6/14/2013: INJ PARAVERT F JNT C/T 1 LEV      Comment: Procedure: CERVICAL FACET INJECTION;  Surgeon:               Karine Witt MD;  Location: [place] [address] MANAGEMENT  6/14/2013: INJ PARAVERT F JNT C/T 1 LEV [facility] PREOPERATIVE ORDER FOR IV ANT*      Comment: Procedure: CERVICAL FACET INJECTION;  Surgeon:               Carlos Alberto Shaikh MD;  Location: [place] [address] MANAGEMENT  6/14/2013: PATIENT [facility] PREOPERATIVE ORDER FOR IV ANT* reviewed the History and Physical done within the last 30 days. Any changes noted above.     [place] 1475  5/8/2018  7:14 AM

## 2018-05-08 NOTE — CONSULTS
General Medicine Consult      Reason for consult: medical management    Consulted by: Dr. Eli Neil    PCP: Cameron De Dios MD      History of Present Illness: Patient is a 79year old male with mmp including but not limited to HTN/HL, CAD, COPD, chronic CORONARY ANGIO*      Comment: most recent Jan/2015  No date: COLONOSCOPY  9/30/2013: DESTROY CERV/THOR FACET JNT      Comment: Procedure: CERVICAL RADIOFREQUENCY;  Surgeon:                Parminder Kirk MD;  Location: [address] [place] [address] Surgeon: Olga Guevara MD;  Location: [address] [place] [address] MANAGEMENT  03/07: OTHER SURGICAL HISTORY      Comment: C3,4,5 disk repair  5/28/2013: PATIENT DOCUMENTED NOT TO HAVE EXPERIENCED ANY*      Comment: Procedure: Ana Rosales as Taking for the 5/8/18 encounter [facility] Encounter):  gabapentin (NEURONTIN) 800 MG Oral Tab Take 1 tablet (800 mg total) by mouth 4 (four) times daily as needed.  for 30 days Disp: 120 tablet Rfl: 5   DULoxetine HCl (CYMBALTA) 60 MG Oral Cap DR Garay SpO2 97%   BMI 23.70 kg/m²   General:  Alert, NAD, appears stated age, NG in place draining bile, no accessory m use   Head:  Normocephalic, without obvious abnormality, atraumatic. Eyes:  Sclera anicteric,  EOMs intact. Lids wnl.  PE    Ears, nose, throa radiologist.  Pleural parenchymal scarring with emphysematous changes in the lung bases with bronchial wall thickening suggestive of bronchitis. Cholelithiasis and sludge in the gallbladder.   Thickened small bowel loops may be reactive or related to enter when able    **COPD- no wheezing or s/s exacerbation, monitor    **chronic b/l LE neuropathy and chronic neck and shoulder pain- stable, monitor    **depression, anxiety-stable, continue home meds when able    **PPx-heparin subq        Outpatient records o

## 2018-05-08 NOTE — CONSULTS
Critical Care Consult     Assessment / Plan:  1. Gastric perforation s/p repair  - Zosyn  - NGT to LIS  - pain control  - per surgery  2. COPD and emphysema  - bd protocol  3. Tobacco use  - encouraged cessation  4. ROBB  - monitor response to IVFs  5.  HTN Comment: Procedure: CERVICAL RADIOFREQUENCY;  Surgeon:                Eduardo Crenshaw MD;  Location: [place] [address] MANAGEMENT  9/30/2013: DESTROY CERV/THOR FACET JNT      Comment: Procedure: [id number] [address];  Surgeon: SURGICAL HISTORY      Comment: C3,4,5 disk repair  5/28/2013: PATIENT DOCUMENTED NOT TO HAVE EXPERIENCED ANY*      Comment: Procedure: CERVICAL FACET INJECTION;  Surgeon:               Danielle Casas MD;  Location: [facility] tablet (50 mg total) by mouth daily. Disp: 60 tablet Rfl: 0    gabapentin (NEURONTIN) 800 MG Oral Tab Take 1 tablet (800 mg total) by mouth 4 (four) times daily as needed.  for 30 days Disp: 120 tablet Rfl: 5 Taking   DULoxetine HCl (CYMBALTA) 60 MG Oral Ca Concern   None on file     Social History Narrative   None on file       No family history on file.       Exam:   05/08/18  1130 05/08/18  1140 05/08/18  1150 05/08/18  1200   BP: 121/70      BP Location:       Pulse: 98 87 91 88   Resp: 20 19 19 17   Temp:

## 2018-05-08 NOTE — ED INITIAL ASSESSMENT (HPI)
Arrived via EMS for c/o abd pain, more to the L side, onset 1930 last noc. Pt reports 1x vomiting, 1x diarrhea. States nausea better at this time.  Pt denies urinary sxs

## 2018-05-08 NOTE — SLP NOTE
Attempted to see pt for speech therapy services. RN reporting pt to remain NPO for pending procedure. Will follow up. Thank you.     Chemo Currie M.S. [address] [facility]  Pager [phone]

## 2018-05-08 NOTE — BRIEF OP NOTE
Pre-Operative Diagnosis: Perforated stomach ([address] [K25.5]     Post-Operative Diagnosis: Perforated stomach ([address] [K25.5]      Procedure Performed:   Procedure(s):  exploratory laparotomy, repair perforated stomach    Surgeon(s) and Role:     * Mike Dwyer

## 2018-05-08 NOTE — ANESTHESIA PREPROCEDURE EVALUATION
PRE-OP EVALUATION    Patient Name: Willi Chandler.     Pre-op Diagnosis: Perforated stomach ([address] [K25.5]    Procedure(s):  exploratory laparotomy, repair perforated stomach    Surgeon(s) and Role:     * Mis Barone MD - Primary    Pre-op vitals review Depression, major, recurrent, moderate (HCC)     At risk for falling     Gastrointestinal hemorrhage associated with acute gastritis     Squamous cell lung cancer, unspecified laterality ([facility]     Pulmonary hypertension ([facility]     Tobacco use     Osteomye FOR                PAIN MANAGEMENT  6/14/2013: INJ PARAVERT F JNT C/T 1 LEV      Comment: Procedure: CERVICAL FACET INJECTION;  Surgeon:               Brittany Ojeda MD;  Location: [place] [address] MANAGEMENT  5/28/2013: SARAH BY JOSE ALEJANDRO Luong PAIN MANAGEMENT  6/14/2013: PATIENT [place] PREOPERATIVE ORDER FOR IV ANT*      Comment: Procedure: CERVICAL FACET INJECTION;  Surgeon:               Jeffy Fraire MD;  Location: [place] [address] MANAGEMENT  9/30/2013: PATIENT [place] CA 8.9 05/08/2018   CA 9.5 02/14/2018            Airway      Mallampati: II  Mouth opening: >3 FB  TM distance: > 6 cm  Neck ROM: full Cardiovascular    Cardiovascular exam normal.         Dental    No notable dental history.          Pulmonary    Pulmona

## 2018-05-09 PROCEDURE — S0028 INJECTION, FAMOTIDINE, 20 MG: HCPCS | Performed by: SURGERY

## 2018-05-09 PROCEDURE — 80048 BASIC METABOLIC PNL TOTAL CA: CPT | Performed by: NURSE PRACTITIONER

## 2018-05-09 PROCEDURE — 97116 GAIT TRAINING THERAPY: CPT

## 2018-05-09 PROCEDURE — 83735 ASSAY OF MAGNESIUM: CPT | Performed by: HOSPITALIST

## 2018-05-09 PROCEDURE — 97162 PT EVAL MOD COMPLEX 30 MIN: CPT

## 2018-05-09 PROCEDURE — 82962 GLUCOSE BLOOD TEST: CPT

## 2018-05-09 PROCEDURE — 85027 COMPLETE CBC AUTOMATED: CPT | Performed by: NURSE PRACTITIONER

## 2018-05-09 RX ORDER — MAGNESIUM SULFATE HEPTAHYDRATE 40 MG/ML
2 INJECTION, SOLUTION INTRAVENOUS ONCE
Status: COMPLETED | OUTPATIENT
Start: 2018-05-09 | End: 2018-05-09

## 2018-05-09 RX ORDER — HYDRALAZINE HYDROCHLORIDE 20 MG/ML
10 INJECTION INTRAMUSCULAR; INTRAVENOUS EVERY 6 HOURS PRN
Status: DISCONTINUED | OUTPATIENT
Start: 2018-05-09 | End: 2018-05-15

## 2018-05-09 NOTE — PAYOR COMM NOTE
--------------  ADMISSION REVIEW     Payor: [employer]  Subscriber #:  [id number]  Authorization Number: N/A    Admit date: 5/8/18  Admit time: 0       Admitting Physician: Bhavna Cox MD  Attending Physician:  MD Julien Rodriguez WITH PLATELET.   Procedure                               Abnormality         Status                     ---------                               -----------         ------                     CBC W/ DIFFERENTIAL[[id number]]          Abnormal            Final Probable cholelithiasis. IVC filter. Atherosclerosis is noted of the aorta and its branches. Thickening is noted of the wall of loops of small bowel diffusely. Appearance can be seen with enteritis.   May potentially be reactive from inflammation Marybel Guillaume RN      dextrose 5 % /lactated ringers infusion     Date Action Dose Route User    5/8/2018 2000 Rate/Dose Verify (none) Intravenous Marybel Guillaume RN    5/8/2018 1113 New Bag (none) Intravenous Cynthia Jensen RN      dextrose 5 % / 5/8/2018 2137 New Bag 3.375 g Intravenous Narciso Starr, RN    5/8/2018 1433 New Bag 3.375 g Intravenous Quin Sauceda, RN      sodium bicarbonate injection 50 mEq     Date Action Dose Route User    5/8/2018 1543 Given 50 mEq Intravenous Dillon Esquivel,

## 2018-05-09 NOTE — PHYSICAL THERAPY NOTE
PHYSICAL THERAPY EVALUATION - INPATIENT     Room Number: 469/469-A  Evaluation Date: 5/9/2018  Type of Evaluation: Initial  Physician Order: PT Eval and Treat    Presenting Problem: Perforated Viscus, s/p exp lap for repair on 5/8/18  Reason for Ther whether generalized or localized, unspecified site    • Other and unspecified hyperlipidemia    • Vertigo     11/2014   • Visual impairment     Rt eye severe impairment       Past Surgical History  Past Surgical History:  No date: CATH BARE METAL STENT ([facility] PAIN MANAGEMENT  9/30/2013: SARAH BY JOSE ALEJANDRO CARR [facility] SV 5+ YR      Comment: Procedure: CERVICAL RADIOFREQUENCY;  Surgeon:                Henrry Llamas MD;  Location: [address] [place] [address] Drive MANAGEMENT  11/11/2015: SARAH BY JOSE ALEJANDRO CARR [address] [facility]  [place] FOR PAIN MANAGEMENT  11/11/2015: Nancy Robert Bilateral      Comment: Procedure: STIMULATOR TRIAL EPIDURAL LEAD;                 Surgeon: Damien Soni MD;  Location: [address] [place] [address] to neutral dorsiflexion. Pt reports that this is due to his neuropathy - limited mobility.     Lower extremity strength is within functional limits - within available range    BALANCE  Static Sitting: Fair  Dynamic Sitting: Fair -  Static Standing: Poor + removed by APN who came in during session). Pt educated on proper hand placement and completed sit<>stand w/ min a of 1. Pt completed gait 5'x1 w/ rolling walker, min a of 1 w/ one extra to manage the equipment to bedside chair.   Pt educated on using pil function. DISCHARGE RECOMMENDATIONS  PT Discharge Recommendations: Home with home health PT    PLAN  PT Treatment Plan: Bed mobility; Patient education;Gait training;Strengthening;Stoop training;Transfer training;Balance training  Rehab Potential : Good  F

## 2018-05-09 NOTE — OPERATIVE REPORT
[facility]    PATIENT'S NAME: Dottie Garcia   ATTENDING PHYSICIAN: Luna Granados M.D. OPERATING PHYSICIAN: Luna Granados M.D.    PATIENT ACCOUNT#:   [de-identified]    LOCATION:  [address] [place] [address]  MEDICAL RECORD #:   [id number]       DATE OF BIRTH: taken to the recovery room then to the intensive care unit for observation.     Dictated By Mario Bond M.D.  d: 05/08/2018 19:31:28  t: 05/08/2018 20:31:13  [facility] [id number]  [facility]/

## 2018-05-09 NOTE — PROGRESS NOTES
Patient with repeated episodes of hypoglycemia despite D50 administration and IVF of D5LR at 100 ml/h. Will add D10 at 25 ml/h and decrease maintenance IVF to 75 ml/h (total infusion rate 100 ml/h), and continue to closely follow glucose response.   CCI on

## 2018-05-09 NOTE — PLAN OF CARE
CARDIOVASCULAR - ADULT    • Absence of cardiac arrhythmias or at baseline Progressing        GASTROINTESTINAL - ADULT    • Minimal or absence of nausea and vomiting Progressing    • Maintains or returns to baseline bowel function Progressing        RESPIRA

## 2018-05-09 NOTE — PROGRESS NOTES
[facility]  Progress Note    Patricio Angelo.  Patient Status:  Inpatient    12/10/1950 MRN [id number]   [facility] 4SW-A Attending Rand Martin MD   Hosp Day # 1 PCP Cameron De Dios MD     Subjective:    Patient sitting up in bed, p Leukocytosis     Acute kidney injury ([facility]     Hyperglycemia     Perforated viscus      Impression:     78 y/o S/P: exploratory laparotomy, repair of perforated stomach  -t max 100.3  cx: pending  - wbc 13.3, hgb stable     Plan:    NPO, NG to LIS  Encour

## 2018-05-09 NOTE — SLP NOTE
Attempted to see pt for speech therapy services. Pt to remain NPO per general surgery recommendation. Will await clearance prior to proceeding with evaluation. RN aware. Thank you.     Jayden Kaur M.S. Guillermo Roque  Pager [phone]

## 2018-05-09 NOTE — CM/SW NOTE
CM met with Mr Jill Correa re: ZSC7. Resources given for depression and anxiety treatment. CM/SW to follow.

## 2018-05-09 NOTE — PROGRESS NOTES
[address] [facility]. Patient Status:  Inpatient    12/10/1950 MRN [id number]   [facility] 4SW-A Attending Oral MD Jigar   Hosp Day # 1 PCP Sue Antony MD     Pulm / Critical Care Progress Note     S: pt feeling well. 1220  05/09/18   0525   WBC  17.3*  12.6  13.3*   HGB  15.9  13.3  11.9*   HCT  48.8  42.1  36.9*   PLT  254.0  208.0  180.0     Recent Labs   Lab  05/08/18   0552   INR  1.01         Recent Labs   Lab  05/08/18   0353  05/08/18   1220  05/08/18   1851  05

## 2018-05-09 NOTE — PROGRESS NOTES
[facility] Hospitalist Progress Note     PCP: Elizabeth Lazo MD    CC:  Follow up    SUBJECTIVE:  PT sitting up in chair, pain ok. No n/v/f/c.  Talkative, conversational    Had some hypoglycemia earlier and started on D10 @ 25cc/hr    OBJECTIVE:  Temp:  [98. 05/09/18   0757  05/09/18   1137   [facility]  78  84  93  107*  108*            Meds:     • Heparin Sodium (Porcine)  5,000 Units Subcutaneous 2 times per day   • famoTIDine  20 mg Oral Daily    Or   • famoTIDine  20 mg Intravenous Daily   • piperacillin-tazob able     **PPx-heparin subq     Questions/concerns were discussed with patient and/or family by bedside.      Thank Summer Umana MD    [facility] Hospitalist  Pager: [phone]

## 2018-05-10 PROCEDURE — 85025 COMPLETE CBC W/AUTO DIFF WBC: CPT | Performed by: NURSE PRACTITIONER

## 2018-05-10 PROCEDURE — 83735 ASSAY OF MAGNESIUM: CPT | Performed by: HOSPITALIST

## 2018-05-10 PROCEDURE — 97116 GAIT TRAINING THERAPY: CPT

## 2018-05-10 PROCEDURE — S0028 INJECTION, FAMOTIDINE, 20 MG: HCPCS | Performed by: SURGERY

## 2018-05-10 PROCEDURE — 97530 THERAPEUTIC ACTIVITIES: CPT

## 2018-05-10 PROCEDURE — C9113 INJ PANTOPRAZOLE SODIUM, VIA: HCPCS | Performed by: INTERNAL MEDICINE

## 2018-05-10 PROCEDURE — 82962 GLUCOSE BLOOD TEST: CPT

## 2018-05-10 PROCEDURE — 97535 SELF CARE MNGMENT TRAINING: CPT

## 2018-05-10 PROCEDURE — 97165 OT EVAL LOW COMPLEX 30 MIN: CPT

## 2018-05-10 PROCEDURE — 80048 BASIC METABOLIC PNL TOTAL CA: CPT | Performed by: NURSE PRACTITIONER

## 2018-05-10 PROCEDURE — 85018 HEMOGLOBIN: CPT | Performed by: INTERNAL MEDICINE

## 2018-05-10 RX ORDER — LORAZEPAM 2 MG/ML
0.5 INJECTION INTRAMUSCULAR NIGHTLY PRN
Status: DISCONTINUED | OUTPATIENT
Start: 2018-05-10 | End: 2018-05-15

## 2018-05-10 NOTE — SLP NOTE
Attempted to see pt for speech therapy services. Pt to remain NPO per general surgery recommendation. Will await clearance prior to proceeding with evaluation. RN aware.  Thank you.     DENTON Ghotra.S. [address] [facility]  Pager [phone]

## 2018-05-10 NOTE — PHYSICAL THERAPY NOTE
PHYSICAL THERAPY TREATMENT NOTE - INPATIENT    Room Number: 974/984-W     Session: 1   Number of Visits to Meet Established Goals: 5    Presenting Problem: Perforated Viscus, s/p exp lap for repair on 5/8/18     History related to current admission: Pt is METAL STENT (BMS)  2014: CATH DRUG ELUTING STENT  No date: CATH PERCUTANEOUS  TRANSLUMINAL CORONARY ANGIO*      Comment: most recent Jan/2015  No date: COLONOSCOPY  9/30/2013: DESTROY CERV/THOR FACET JNT      Comment: Procedure: CERVICAL RADIOFREQUENCY;  S PHYS PERFRMG SVC 5+ YR Bilateral      Comment: Procedure: STIMULATOR TRIAL EPIDURAL LEAD;                 Surgeon: Boubacar Narayanan MD;  Location: [address] [place] [address] MANAGEMENT  03/07: OTHER SURGICAL HISTORY      Comment: C3,4,5 disk repair MANAGEMENT  No date: TONSILLECTOMY    SUBJECTIVE  \"I'm in a panic\"   Pt requires cues to redirect pt to task at hand    Patient’s self-stated goal is not stated    OBJECTIVE  Precautions: Drain(s); Other (Comment) (Ng suction, abdominal binder for comfort feeling panicked and is preoccupied with thoughts preventing him from sleeping. Pt expresses concern that daughter will have him go down to [place] to w/d $300 or take his card to do so. Pt also reports daughter has written checks in his name.  Emotional suppor demonstrate transfers EOB to/from [facility] at assistance level: modified independent      Goal #3 Patient is able to ambulate 150 feet with assist device: walker - rolling at assistance level: supervision      Goal #4     Goal #5     Goal #6     Goal Comments:

## 2018-05-10 NOTE — CONSULTS
[facility]  Report of Consultation    Vanessa Cortez.  Patient Status:  Inpatient    12/10/1950 MRN [id number]   [facility] 3NW-A Attending Floresita Marshall MD   Hosp Day # 2 PCP Eliud Samaniego MD     Reason for Consultation:  Mindy James of large duodenal ulcer bleed s/p laparotomy in 2014 and had EGD w/ Dr Laurel Duane    Since surgery, he was doing well. Pt was on h2ra after surgery. Today he was noted by surg team to have some maroon output from ng. Pt denies flatus or melena or n/v.     Bu ARTERY DISEASE 2013    x2 cardiac STENTS   • Coronary atherosclerosis    • Depression    • ETOH abuse    • GI bleed requiring more than 4 units of blood in 24 hours, ICU, or surgery 2014    due to asa/ nsaids----gastric erosions   • Hearing impairment FOR                PAIN MANAGEMENT  5/28/2013: SARAH BY  LILLY RESTREPO [facility] 5+ YR      Comment: Procedure: CERVICAL FACET INJECTION;  Surgeon:               Ana Lo MD;  Location: [place] [address] MANAGEMENT  6/14/2013: German Valenzuela PAIN MANAGEMENT  9/30/2013: PATIENT [place] PREOPERATIVE ORDER FOR IV ANT*      Comment: Procedure: CERVICAL RADIOFREQUENCY;  Surgeon:                Betina Mcrae MD;  Location: [place] [address] MANAGEMENT  11/11/2015: Nebulization Q6H PRN   nicotine (NICODERM CQ) 21 MG/24HR 1 patch 1 patch Transdermal Daily   glucose (DEX4) oral liquid 15 g 15 g Oral Q15 Min PRN   Or      Glucose-Vitamin C (DEX-4) 4-0.006 g chewable tab 4 tablet 4 tablet Oral Q15 Min PRN   Or      dextr Cap TAKE ONE CAPSULE BY MOUTH EVERY NIGHT AT BEDTIME AS NEEDED FOR SLEEP Disp: 90 capsule Rfl: 1   Cholecalciferol (VITAMIN D) 2000 UNITS Oral Cap Take 1 capsule by mouth daily.  Disp:  Rfl:          Review of Systems:  GENERAL HEALTH: otherwise feels well, 103*  106*   BUN  29*   --   30*  23*   CREATSERUM  1.74*   --   1.35*  1.37*   GFRAA  46*   --   62  61   GFRNAA  40*   --   54*  53*   CA  8.0*   --   8.4  8.7   NA  140   --   141  140   K  5.8*  4.7  4.7  4.4   CL  114*   --   110  105   CO2  19.0*   -

## 2018-05-10 NOTE — PLAN OF CARE
GASTROINTESTINAL - ADULT    • Maintains or returns to baseline bowel function Not Progressing        Patient/Family Goals    • Patient/Family Long Term Goal Not Progressing    • Patient/Family Short Term Goal Not Progressing          CARDIOVASCULAR - ADULT

## 2018-05-10 NOTE — PROGRESS NOTES
[facility]  Progress Note    Robby Noe.  Patient Status:  Inpatient    12/10/1950 MRN [id number]   [facility] 3NW-A Attending Gloria Honeycutt MD   Hosp Day # 2 PCP Shirley Sawyer MD     Subjective:    Patient reports pain control Leukocytosis     Acute kidney injury ([facility] [address]     Hyperglycemia     Perforated viscus      Impression:     80 y/o S/P: ex laparotomy, repair of perforated stomach  -t max 99.7, leukocytosis trended up 14.3  -cx: pending  Plan:    Continue NG to LIS, NPO  [place]

## 2018-05-10 NOTE — PROGRESS NOTES
[facility] Hospitalist Progress Note     PCP: Caterina Moctezuma MD    CC:  Follow up    SUBJECTIVE:  Feels okay. Talkative. Afebrile.      OBJECTIVE:  Temp:  [98.2 °F (36.8 °C)-99.7 °F (37.6 °C)] 98.4 °F (36.9 °C)  Pulse:  [] 95  Resp:  [15-30] 18  BP: (11 Lab  05/09/18   0757  05/09/18   1137  05/09/18   1755  05/09/18   2348  05/10/18   0535   PGLU  107*  108*  111*  96  108*            Meds:     • Heparin Sodium (Porcine)  5,000 Units Subcutaneous 2 times per day   • famoTIDine  20 mg Oral Daily    Or shoulder pain- stable, monitor     **depression, anxiety-stable, continue home meds when able     **PPx-heparin subq

## 2018-05-10 NOTE — PLAN OF CARE
CARDIOVASCULAR - ADULT    • Maintains optimal cardiac output and hemodynamic stability Adequate for Discharge    • Absence of cardiac arrhythmias or at baseline Adequate for Discharge        RESPIRATORY - ADULT    • Achieves optimal ventilation and oxygena

## 2018-05-10 NOTE — OCCUPATIONAL THERAPY NOTE
OCCUPATIONAL THERAPY EVALUATION - INPATIENT     Room Number: 306/306-A  Evaluation Date: 5/10/2018  Type of Evaluation: Initial  Presenting Problem: sepsis, perforated gastric ulcer s/p ex lap    Physician Order: IP Consult to Occupational Therapy  Reason Rt eye severe impairment       Past Surgical History  Past Surgical History:  No date: CATH BARE METAL STENT (BMS)  2014: CATH DRUG ELUTING STENT  No date: CATH PERCUTANEOUS  TRANSLUMINAL CORONARY ANGIO*      Comment: most recent Jan/2015  No date: COLO Na Ozuna MD;  Location: [address] [place] [address] Drive MANAGEMENT  11/11/2015: M-ANGELAAJ BY [facility] [address] [address]  N SVC 5+ YR Bilateral      Comment: Procedure: STIMULATOR TRIAL EPIDURAL LEAD;                 Surgeon: Na Ozuna MD;  Location: [facility] EPIDURAL LEAD;                 Surgeon: Willie Stafford MD;  Location: [facility] FOR PAIN MANAGEMENT  No date: TONSILLECTOMY    OCCUPATIONAL PROFILE    HOME SITUATION  Type of Home: Apartment  Home Layout: One level  Lives With: Alone    To limits    SENSATION  Light touch:  impaired    Communication: tangential, difficulty making needs known due to tangents    Behavioral/Emotional/Social: pleasant and cooperative, perseverative on financial situation with daughter, feeling \"panicked\" and i transfers. Pt completes transfer to bedside chair with supervision using RW. Patient End of Session: Up in chair;Call light within reach; Needs met;RN aware of session/findings; All patient questions and concerns addressed; Discussed recommendations with Complexity LOW     OT Discharge Recommendations: 24 hour care/supervision;Home with home health PT/OT       PLAN  OT Treatment Plan: Balance activities; Energy conservation/work simplification techniques;ADL training;Functional transfer training;UE strength

## 2018-05-11 PROCEDURE — 82962 GLUCOSE BLOOD TEST: CPT

## 2018-05-11 PROCEDURE — 97530 THERAPEUTIC ACTIVITIES: CPT

## 2018-05-11 PROCEDURE — 97535 SELF CARE MNGMENT TRAINING: CPT

## 2018-05-11 PROCEDURE — C9113 INJ PANTOPRAZOLE SODIUM, VIA: HCPCS | Performed by: INTERNAL MEDICINE

## 2018-05-11 PROCEDURE — 85018 HEMOGLOBIN: CPT | Performed by: INTERNAL MEDICINE

## 2018-05-11 PROCEDURE — 80048 BASIC METABOLIC PNL TOTAL CA: CPT | Performed by: INTERNAL MEDICINE

## 2018-05-11 RX ORDER — ACETAMINOPHEN 10 MG/ML
1000 INJECTION, SOLUTION INTRAVENOUS EVERY 6 HOURS PRN
Status: DISCONTINUED | OUTPATIENT
Start: 2018-05-11 | End: 2018-05-13

## 2018-05-11 NOTE — PROGRESS NOTES
[facility]  Progress Note    Sierra Macedo.  Patient Status:  Inpatient    12/10/1950 MRN [id number]   [facility] 3NW-A Attending Addi Branham, [address] [facility] [address] Day # 3 PCP Caterina Moctezuma MD     Subjective:    Patient denies any new compl viscus      Impression:     78 y/o S/P: ex lap, repair of perforated gastric ulcer  hgb stable 10.3  -afebrile  -cx:prelim gram neg mao    Plan:     Will clamp NG tube x 6 hrs if pain, n/v reconnect to LIS, check residual after 6 hours if less than 150cc ca

## 2018-05-11 NOTE — PROGRESS NOTES
[facility]  Progress Note    Mariaelena Mckeon.  Patient Status:  Inpatient    12/10/1950 MRN [id number]   [facility] 3NW-A Attending Jamie Kaur, [address] [facility] Se Day # 3 PCP Tesha Cheung MD     Subjective:  Mariaelena Mckeon. is a(n) 38 ye Oral Q15 Min PRN   dextrose 10 % infusion  Intravenous Continuous   dextrose 5 % /lactated ringers infusion  Intravenous Continuous        Objective:    /63 (BP Location: Left arm)   Pulse 64   Temp 98.6 °F (37 °C) (Oral)   Resp 18   Ht 5' 6\" (1.676 ectasia (HCC)     PAF (paroxysmal atrial fibrillation) ([facility] [address]     Other pulmonary embolism without acute cor pulmonale, unspecified chronicity (HCC)     Chronic obstructive pulmonary disease, unspecified [facility]     Chronic systolic CHF (congestive

## 2018-05-11 NOTE — PAYOR COMM NOTE
--------------  CONTINUED STAY REVIEW      5/10            78 y/o S/P: ex laparotomy, repair of perforated stomach  -t max 99.7, leukocytosis trended up 14.3  -cx: pending  Plan:     Continue NG to LIS, NPO  Encourage ambulation/IS  Continue IV antibiotics

## 2018-05-11 NOTE — PROGRESS NOTES
[facility] Hospitalist Progress Note     PCP: Shirley Sawyer MD    CC:  Follow up    SUBJECTIVE:  Doing okay. Not passing flatus. Pain unchanged. Having some neuropathy pain as well.      OBJECTIVE:  Temp:  [97.9 °F (36.6 °C)-98.9 °F (37.2 °C)] 98.3 °F (36.8 29.0   BUN  29*  29*   --   30*  23*  20   CREATSERUM  1.78*  1.74*   --   1.35*  1.37*  1.21   CA  8.9  8.0*   --   8.4  8.7  8.5   MG   --    --    --   1.8  2.2   --    GLU  111*  106*   --   103*  106*  112*       Recent Labs   Lab  05/08/18   0353   A acidosis-secondary to above, monitor- resolved     **hyperkalemia-resolved. bicarb, dextrose, insulin given  -monitor     **leukocytosis-  Reactive, monitor.  Slightly up     **anemia, acute -expected, monitor HDS     **CAD, HTN/HL-stable, continue po home

## 2018-05-11 NOTE — OCCUPATIONAL THERAPY NOTE
OCCUPATIONAL THERAPY TREATMENT NOTE - INPATIENT     Room Number: 152/688-C  Session: 1   Number of Visits to Meet Established Goals: 5    Presenting Problem: sepsis, perforated gastric ulcer s/p ex lap    History related to current admission:  Pt is 79 yea (BMS)  2014: CATH DRUG ELUTING STENT  No date: CATH PERCUTANEOUS  TRANSLUMINAL CORONARY ANGIO*      Comment: most recent Jan/2015  No date: COLONOSCOPY  9/30/2013: DESTROY CERV/THOR FACET JNT      Comment: Procedure: CERVICAL RADIOFREQUENCY;  Surgeon: [id number] [address]  N SVC 5+ YR Bilateral      Comment: Procedure: STIMULATOR TRIAL EPIDURAL LEAD;                 Surgeon: Edelmira Sargent MD;  Location: [address] [place] [address] MANAGEMENT  03/07: OTHER SURGICAL HISTORY      Comment: C3,4,5 disk repair  5/28 MANAGEMENT  No date: TONSILLECTOMY    SUBJECTIVE  Pt states \"What's the difference between the OT and the PT? \" multiple times during session    Patient self-stated goal is not stated this session    OBJECTIVE  Precautions: Drain(s); Other (Comment) (Ng camp Pt is progressing towards OT goals. Pt presents with deficits in cognition, balance, use of adaptive techniques impacting safety and independence in ADL/functional transfers. Pt with improvement in toilet transfers.  Pt currently requires min (A) for bed

## 2018-05-11 NOTE — SLP NOTE
Pt remains NPO. Per PA, plan for today is to clamp NG x 6 hours (reconnect to LIS if pain or n/v). If residual after 6 hours is <150cc, start CLD. SLP will f/u with swallow eval when pt able to take po. Celio Christina M.S.,CCC-SLP  Speech Leeann Lean

## 2018-05-11 NOTE — HOME CARE LIAISON
TNL GIVEN VERBAL ORDER TO SEE PTNT AND OFFER HH. TNL MET WITH PTNT TO DISCUSS HH.  PTNT DECLINE TNL LEFT BROCHURE WITH PTNT    THANKS  Araseli Nickerson

## 2018-05-12 ENCOUNTER — APPOINTMENT (OUTPATIENT)
Dept: GENERAL RADIOLOGY | Facility: HOSPITAL | Age: 68
DRG: 853 | End: 2018-05-12
Attending: INTERNAL MEDICINE
Payer: MEDICARE

## 2018-05-12 PROCEDURE — 85025 COMPLETE CBC W/AUTO DIFF WBC: CPT | Performed by: INTERNAL MEDICINE

## 2018-05-12 PROCEDURE — 94640 AIRWAY INHALATION TREATMENT: CPT

## 2018-05-12 PROCEDURE — 83605 ASSAY OF LACTIC ACID: CPT | Performed by: INTERNAL MEDICINE

## 2018-05-12 PROCEDURE — 71045 X-RAY EXAM CHEST 1 VIEW: CPT | Performed by: INTERNAL MEDICINE

## 2018-05-12 PROCEDURE — 93010 ELECTROCARDIOGRAM REPORT: CPT | Performed by: INTERNAL MEDICINE

## 2018-05-12 PROCEDURE — 87040 BLOOD CULTURE FOR BACTERIA: CPT | Performed by: INTERNAL MEDICINE

## 2018-05-12 PROCEDURE — 84132 ASSAY OF SERUM POTASSIUM: CPT | Performed by: HOSPITALIST

## 2018-05-12 PROCEDURE — 92610 EVALUATE SWALLOWING FUNCTION: CPT

## 2018-05-12 PROCEDURE — 74018 RADEX ABDOMEN 1 VIEW: CPT | Performed by: INTERNAL MEDICINE

## 2018-05-12 PROCEDURE — 85018 HEMOGLOBIN: CPT | Performed by: INTERNAL MEDICINE

## 2018-05-12 PROCEDURE — 80053 COMPREHEN METABOLIC PANEL: CPT | Performed by: INTERNAL MEDICINE

## 2018-05-12 PROCEDURE — 93005 ELECTROCARDIOGRAM TRACING: CPT

## 2018-05-12 PROCEDURE — 82962 GLUCOSE BLOOD TEST: CPT

## 2018-05-12 RX ORDER — IPRATROPIUM BROMIDE AND ALBUTEROL SULFATE 2.5; .5 MG/3ML; MG/3ML
3 SOLUTION RESPIRATORY (INHALATION) ONCE
Status: COMPLETED | OUTPATIENT
Start: 2018-05-12 | End: 2018-05-12

## 2018-05-12 RX ORDER — PANTOPRAZOLE SODIUM 40 MG/1
40 TABLET, DELAYED RELEASE ORAL
Status: DISCONTINUED | OUTPATIENT
Start: 2018-05-12 | End: 2018-05-15

## 2018-05-12 RX ORDER — METOCLOPRAMIDE HYDROCHLORIDE 5 MG/ML
10 INJECTION INTRAMUSCULAR; INTRAVENOUS EVERY 6 HOURS PRN
Status: DISCONTINUED | OUTPATIENT
Start: 2018-05-12 | End: 2018-05-15

## 2018-05-12 NOTE — PROGRESS NOTES
[facility]  Progress Note    Tesha Cisneros.  Patient Status:  Inpatient    12/10/1950 MRN [id number]   [facility] 3NW-A Attending Suleiman Roa MD   [facility] Day # 4 PCP Miguel Pineda MD     Subjective:  Patient experienced episode in

## 2018-05-12 NOTE — PROGRESS NOTES
PT ALERT AND ORIENTED THIS AM, DOES ASK TO HAVE  SOME CONVERSATION REPEATED, SKIN WARM AND DRY, NG TUBE RESIDUAL CHECKED THIS AM AT 0900 AND 30 CC OF GREEN FLUID NOTED, ABD SOFT, BOWEL SOUNDS NOTED, NG REMOVED AT 0930 PER ORDER AND THEN PT C/O SOME NAUSEA

## 2018-05-12 NOTE — PROGRESS NOTES
[facility] Hospitalist Progress Note     PCP: Clarisa Xie MD    CC:  Follow up    SUBJECTIVE:  NGT removed. Pt feeling well. Ambulating.     OBJECTIVE:  Temp:  [98.1 °F (36.7 °C)-99.6 °F (37.6 °C)] 99.5 °F (37.5 °C)  Pulse:  [70-94] 79  Resp:  [18-36] 20 < >  4.7  4.4  3.5*  3.7  3.9   CL  114*   --   110  105  105  105   --    CO2  19.0*   --   24.0  29.0  29.0  26.0   --    BUN  29*   --   30*  23*  20  13   --    CREATSERUM  1.74*   --   1.35*  1.37*  1.21  1.08   --    CA  8.0*   --   8.4  8.7  8.5  8. indefinitely     **acute kidney injury-improving  -suspect prerenal. Trial of IVF, monitor     **metabolic acidosis-secondary to above, monitor- resolved     **hyperkalemia-resolved.  bicarb, dextrose, insulin given  -monitor     **anemia, acute -expected,

## 2018-05-12 NOTE — SLP NOTE
ADULT SWALLOWING EVALUATION    ASSESSMENT    ASSESSMENT/OVERALL IMPRESSION:  B/S swallow eval completed upon receipt of MD order. Pt reportedly with history of modified diet.   Pt stated that he receives pureed Meals on Wheels due to edentulous oral cavity lung cancer-- resected at [address]   • Congestive heart disease [facility])     per patient Dec 2014   • COPD (chronic obstructive pulmonary disease) ([facility] [address]     doesnt think he has any breathing issues   • CORONARY ARTERY DISEASE 2013    x2 cardiac STENTS   • Coron chair    Oral Phase of Swallow: Within Functional Limits     Pharyngeal Phase of Swallow: Within Functional Limits     (Please note: Silent aspiration cannot be evaluated clinically.  Videofluoroscopic Swallow Study is required to rule-out silent aspiration

## 2018-05-12 NOTE — PROGRESS NOTES
[facility]  Progress Note    Balbina Gomez.  Patient Status:  Inpatient    12/10/1950 MRN [id number]   [facility] 3NW-A Attending Mojgan Mix,  [facility] Se Day # 4 PCP Karen Suazo MD     Subjective:  Balbina Gomez. is a(n) 14 ye (DEX-4) 4-0.006 g chewable tab 8 tablet 8 tablet Oral Q15 Min PRN   dextrose 10 % infusion  Intravenous Continuous   dextrose 5 % /lactated ringers infusion  Intravenous Continuous        Objective:    /81 (BP Location: Left arm)   Pulse 94   Temp 98 gastritis     Squamous cell lung cancer, unspecified laterality (HCC)     Pulmonary hypertension (HCC)     Tobacco use     Osteomyelitis (HCC)     Aorto-iliac atherosclerosis (HCC)     Pulmonary fibrosis (HCC)     Aortic ectasia (HCC)     PAF (paroxysmal a

## 2018-05-12 NOTE — PROGRESS NOTES
PT HOLDING BASIN C/O OF NAUSEA AND \"NOT FEELING WELL\" NOTED PT HAD A VERY FORCEFUL COUGH PRIOR TO THIS C/O. NOTED IN BASIN FROTHY THICK WHITE TO CLEAR MUCUS. NOTED VERY DIAPHORETIC . NG PLACED ON INTERMITTENT LOW SUCTION RETURNS GREEN BILE COLOR  FLUID. V

## 2018-05-12 NOTE — PLAN OF CARE
Maintains or returns to baseline bowel function Not Progressing      Maintains optimal cardiac output and hemodynamic stability Progressing      Absence of cardiac arrhythmias or at baseline Progressing      Minimal or absence of nausea and vomiting Progre

## 2018-05-12 NOTE — PROGRESS NOTES
PT RESTING MORE COMFORTABLY WITH HEAD BED APPROX 30 DEGREES. RESTING. WHEN ASKED HOW HE IS FEELING STATES \"I HURT ALL OVER\" WHEN ASKED ABOUT  NAUSEA STATES STILL HAS IT. CALL OUT TO DR. INGRAM UPDATE ON TEST RESULTS. INFORMED PER PROTOCOL ST CATH PT FOR

## 2018-05-13 PROCEDURE — 82962 GLUCOSE BLOOD TEST: CPT

## 2018-05-13 RX ORDER — HYDROCODONE BITARTRATE AND ACETAMINOPHEN 5; 325 MG/1; MG/1
1 TABLET ORAL EVERY 4 HOURS PRN
Status: DISCONTINUED | OUTPATIENT
Start: 2018-05-13 | End: 2018-05-15

## 2018-05-13 RX ORDER — ACETAMINOPHEN 500 MG
1000 TABLET ORAL EVERY 6 HOURS PRN
Status: DISCONTINUED | OUTPATIENT
Start: 2018-05-13 | End: 2018-05-15

## 2018-05-13 RX ORDER — CARVEDILOL 3.12 MG/1
3.12 TABLET ORAL 2 TIMES DAILY WITH MEALS
Status: DISCONTINUED | OUTPATIENT
Start: 2018-05-13 | End: 2018-05-15

## 2018-05-13 RX ORDER — HYDROCODONE BITARTRATE AND ACETAMINOPHEN 5; 325 MG/1; MG/1
2 TABLET ORAL EVERY 4 HOURS PRN
Status: DISCONTINUED | OUTPATIENT
Start: 2018-05-13 | End: 2018-05-15

## 2018-05-13 RX ORDER — GABAPENTIN 400 MG/1
800 CAPSULE ORAL 4 TIMES DAILY PRN
Status: DISCONTINUED | OUTPATIENT
Start: 2018-05-13 | End: 2018-05-15

## 2018-05-13 NOTE — PROGRESS NOTES
[facility] Hospitalist Progress Note     PCP: Leena Sarabia MD    CC:  Follow up    SUBJECTIVE:  Doing well, sitting up in chair, ambulating. Tolerating clear liquids.  Had dark BM     OBJECTIVE:  Temp:  [97.8 °F (36.6 °C)-99.4 °F (37.4 °C)] 97.8 °F (36.6 °C 05/12/18   0156  05/12/18   0505   NA  140   --   141  140  140  139   --    K  5.8*   < >  4.7  4.4  3.5*  3.7  3.9   CL  114*   --   110  105  105  105   --    CO2  19.0*   --   24.0  29.0  29.0  26.0   --    BUN  29*   --   30*  23*  20  13   --    CREA perforated stomach 5/8/18  -management per  Surgery  -ngt removed, clear liquids but advancing diet further  -ppi bid indefinitely  -have ordered oral pain meds, stop IVF     **acute kidney injury-improving  -suspect prerenal. Trial of IVF, monitor     **m

## 2018-05-13 NOTE — PLAN OF CARE
Maintains optimal cardiac output and hemodynamic stability Progressing      Absence of cardiac arrhythmias or at baseline Progressing      Minimal or absence of nausea and vomiting Progressing      Maintains or returns to baseline bowel function Progressin

## 2018-05-13 NOTE — PROGRESS NOTES
[facility]  Progress Note    Melva Cargo. Patient Status:  Inpatient    12/10/1950 MRN [id number]   [facility] 3NW-A Attending Zachary Mitchell, [address] [facility] [address] Se Day # 5 PCP Andres West MD     Subjective:  Patient ambulating in halls.

## 2018-05-14 PROCEDURE — 97116 GAIT TRAINING THERAPY: CPT

## 2018-05-14 PROCEDURE — 97530 THERAPEUTIC ACTIVITIES: CPT

## 2018-05-14 RX ORDER — LISINOPRIL 10 MG/1
10 TABLET ORAL DAILY
Status: DISCONTINUED | OUTPATIENT
Start: 2018-05-14 | End: 2018-05-15

## 2018-05-14 RX ORDER — ATORVASTATIN CALCIUM 40 MG/1
40 TABLET, FILM COATED ORAL NIGHTLY
Status: DISCONTINUED | OUTPATIENT
Start: 2018-05-14 | End: 2018-05-15

## 2018-05-14 RX ORDER — TRAMADOL HYDROCHLORIDE 50 MG/1
50 TABLET ORAL EVERY 6 HOURS PRN
Status: DISCONTINUED | OUTPATIENT
Start: 2018-05-14 | End: 2018-05-15

## 2018-05-14 NOTE — CM/SW NOTE
Pt lives alone in an apartment in [place]. He has a history of GIA at [facility], of [facility] with [facility] and [address] [place] [address] for O2. He rarely drives, only when his neuropathy is not bothersome. He gets Meals on Wheels through [employer].  At other times

## 2018-05-14 NOTE — PROGRESS NOTES
[facility] Hospitalist Progress Note     PCP: Andres West MD    CC:  Follow up    SUBJECTIVE:  Afebrile. Doing well, no sob/dizziness.  Had dark BMs, eating solids    OBJECTIVE:  Temp:  [97.6 °F (36.4 °C)-99.3 °F (37.4 °C)] 97.6 °F (36.4 °C)  Pulse:  [90 05/12/18   0505   NA  140   --   141  140  140  139   --    K  5.8*   < >  4.7  4.4  3.5*  3.7  3.9   CL  114*   --   110  105  105  105   --    CO2  19.0*   --   24.0  29.0  29.0  26.0   --    BUN  29*   --   30*  23*  20  13   --    CREATSERUM  1.74*   - 5/8/18  -management per  Surgery  -ngt removed, diet being advanced  -ppi bid indefinitely  -have ordered oral pain meds, stop IVF     **acute kidney injury/metabolic acidosis/hyperkalemia- resolved     **anemia, acute -expected, monitor HDS     **CAD, HTN

## 2018-05-14 NOTE — CONSULTS
INFECTIOUS DISEASE CONSULT NOTE    Cathy Bruce.  Patient Status:  Inpatient    12/10/1950 MRN [id number]   [facility] 3NW-A Attending Xiao Akhtar, [address] [facility] Se Day # 6 HILARIO Parkinson Surgical History:  No date: CATH BARE METAL STENT (BMS)  2014: CATH DRUG ELUTING STENT  No date: CATH PERCUTANEOUS  TRANSLUMINAL CORONARY ANGIO*      Comment: most recent Jan/2015  No date: COLONOSCOPY  9/30/2013: ADAMA STALLWORTH/SHANTELL YOUNGBLOOD      Comment: MANAGEMENT  11/11/2015: DENTON-DERIC BY JOSE ALEJANDRO RESTREPO SVC 5+ YR Bilateral      Comment: Procedure: STIMULATOR TRIAL EPIDURAL LEAD;                 Surgeon: Amado Harrington MD;  Location: [address] [place] [address] MANAGEMENT  03/07: OTHER SURGICAL HIS Location: [facility]                [facility]  No date: TONSILLECTOMY  No family history on file. reports that he has been smoking Cigarettes. He has a 14.00 pack-year smoking history.  He has never used smokeless tobacco. He reports that he do nicotine (NICODERM CQ) 21 MG/24HR 1 patch, 1 patch, Transdermal, Daily  •  glucose (DEX4) oral liquid 15 g, 15 g, Oral, Q15 Min PRN **OR** Glucose-Vitamin C (DEX-4) 4-0.006 g chewable tab 4 tablet, 4 tablet, Oral, Q15 Min PRN **OR** dextrose 50 % injection --   179.0   --     < > = values in this interval not displayed.      Recent Labs   Lab  05/08/18   0353   05/10/18   0533  05/11/18   0512  05/12/18   0156  05/12/18   0505   GLU  111*   < >  106*  112*  110*   --    BUN  29*   < >  23*  20  13   --    CR report from [address] [place] [address] radiology.      Dictated by: Eden John MD on 5/12/2018 at 7:38     Approved by: Eden John MD            Ct Abdomen+pelvis(cpt=74176)    Result Date: 5/8/2018  PROCEDURE:  CT ABDOMEN+PELVIS (FCZ=[id number])  COMPARISON:  TITO stomach along the greater curvature. Recommend emergent surgical consultation. There is thickening of multiple loops of small bowel diffusely. This may be seen with enteritis. Differential includes reactive inflammation due to perforation.   PELVIC ORGA mild apical redistribution. Mild interstitial abnormalities at the lungs are stable. Small right pleural effusion is noted. No pneumothorax. Minimal right perihilar opacity may represent a vague consolidation.       CONCLUSION:   Mild pulmonary vascular transition to po abx but would keep on IV abx while here  Case and plan d/w pt  Thank you for allowing me to participate in the care of this patient. Please do not hesitate to call if you have any questions.    I will continue to follow with you and will ma

## 2018-05-14 NOTE — HOME CARE LIAISON
MET WITH PTNT TO DISCUSS HOME HEALTH SERVICES AND COVERAGE CRITERIA. PTNT AGREEABLE TO Humberto Valentino. PTNT GIVEN RESIDENTIAL BROCHURE. RESIDENTIAL WITH PROVIDE SN/PT ON DISCHARGE.     Thank you for this referral,   Osiris Mauro

## 2018-05-14 NOTE — PLAN OF CARE
CARDIOVASCULAR - ADULT    • Maintains optimal cardiac output and hemodynamic stability Progressing    • Absence of cardiac arrhythmias or at baseline Progressing        Diabetes/Glucose Control    • Glucose maintained within prescribed range Progressing place. Bed alarm on. Updated on plan of care. Call light within reach. Will continue to monitor.

## 2018-05-14 NOTE — PROGRESS NOTES
[facility]  Progress Note    Shannan Hammonds.  Patient Status:  Inpatient    12/10/1950 MRN [id number]   [facility] [place] 3NW-A Attending Zhanna Shields,  [facility] Se Day # 6 PCP Roland Jade MD     Subjective:    Patient tolerating PO diet, MRSA  -afebrile    Plan:    Consulted ID, appreciate recs  -soft diet as tolerated  -encourage ambulation/IS  -likely home tonight/tomorrow-   -to be dc home with PPI  f/u in office in 1 week       Margarito Martinez

## 2018-05-14 NOTE — PLAN OF CARE
Problem: CARDIOVASCULAR - ADULT  Goal: Maintains optimal cardiac output and hemodynamic stability  INTERVENTIONS:  - Monitor vital signs, rhythm, and trends  - Monitor for bleeding, hypotension and signs of decreased cardiac output  - Evaluate effectivenes absence of nausea and vomiting  INTERVENTIONS:  - Maintain adequate hydration with IV or PO as ordered and tolerated  - Nasogastric tube to low intermittent suction as ordered  - Evaluate effectiveness of ordered antiemetic medications  - Provide nonpharma care  Interventions:  - Assess ability and encourage patient to participate in ADLs to maximize function  - Promote sitting position while performing ADLs such as feeding, grooming, and bathing  - Educate and encourage patient/family in tolerated functiona

## 2018-05-15 VITALS
BODY MASS INDEX: 23.41 KG/M2 | HEART RATE: 79 BPM | HEIGHT: 66 IN | WEIGHT: 145.63 LBS | DIASTOLIC BLOOD PRESSURE: 79 MMHG | TEMPERATURE: 98 F | OXYGEN SATURATION: 99 % | SYSTOLIC BLOOD PRESSURE: 150 MMHG | RESPIRATION RATE: 18 BRPM

## 2018-05-15 PROCEDURE — 97530 THERAPEUTIC ACTIVITIES: CPT

## 2018-05-15 PROCEDURE — 97535 SELF CARE MNGMENT TRAINING: CPT

## 2018-05-15 PROCEDURE — 97116 GAIT TRAINING THERAPY: CPT

## 2018-05-15 PROCEDURE — 97110 THERAPEUTIC EXERCISES: CPT

## 2018-05-15 RX ORDER — METRONIDAZOLE 500 MG/1
500 TABLET ORAL 3 TIMES DAILY
Qty: 21 TABLET | Refills: 0 | Status: SHIPPED | OUTPATIENT
Start: 2018-05-15 | End: 2018-05-22

## 2018-05-15 RX ORDER — SULFAMETHOXAZOLE AND TRIMETHOPRIM 800; 160 MG/1; MG/1
1 TABLET ORAL 2 TIMES DAILY
Qty: 14 TABLET | Refills: 0 | Status: SHIPPED | OUTPATIENT
Start: 2018-05-15 | End: 2018-05-22

## 2018-05-15 RX ORDER — LEVOFLOXACIN 750 MG/1
750 TABLET ORAL DAILY
Qty: 7 TABLET | Refills: 0 | Status: SHIPPED | OUTPATIENT
Start: 2018-05-15 | End: 2018-05-22

## 2018-05-15 RX ORDER — HYDROCODONE BITARTRATE AND ACETAMINOPHEN 5; 325 MG/1; MG/1
1 TABLET ORAL EVERY 4 HOURS PRN
Qty: 30 TABLET | Refills: 0 | Status: SHIPPED | OUTPATIENT
Start: 2018-05-15 | End: 2018-05-25

## 2018-05-15 RX ORDER — PANTOPRAZOLE SODIUM 40 MG/1
40 TABLET, DELAYED RELEASE ORAL
Qty: 60 TABLET | Refills: 0 | Status: SHIPPED | OUTPATIENT
Start: 2018-05-15 | End: 2018-05-25

## 2018-05-15 RX ORDER — METRONIDAZOLE 500 MG/1
500 TABLET ORAL 3 TIMES DAILY
Qty: 21 TABLET | Refills: 0 | Status: SHIPPED | OUTPATIENT
Start: 2018-05-15 | End: 2018-05-15

## 2018-05-15 NOTE — PROGRESS NOTES
NURSING DISCHARGE NOTE    Discharged Home via Wheelchair. Accompanied by Support staff  Belongings Taken by patient/family DISCUSSED D/C INSTRUCTIONS WITH PATIENT  , DRAIN CARE TEACHING DONE , ALL SUPPLY GIVEN .  VERBALIZED UNDERSTANDING PHARMACY Alfredito Villalobos

## 2018-05-15 NOTE — OCCUPATIONAL THERAPY NOTE
OCCUPATIONAL THERAPY TREATMENT NOTE - INPATIENT     Room Number: 792/785-P  Session: 2   Number of Visits to Meet Established Goals: 5    Presenting Problem: sepsis, perforated gastric ulcer s/p ex lap    History related to current admission:  Pt is 79 yea (BMS)  2014: CATH DRUG ELUTING STENT  No date: CATH PERCUTANEOUS  TRANSLUMINAL CORONARY ANGIO*      Comment: most recent Jan/2015  No date: COLONOSCOPY  9/30/2013: DESTROY CERV/THOR FACET JNT      Comment: Procedure: CERVICAL RADIOFREQUENCY;  Surgeon: [id number] .S. [facility] 59  N SVC 5+ YR Bilateral      Comment: Procedure: STIMULATOR TRIAL EPIDURAL LEAD;                 Surgeon: Jeffy Fraire MD;  Location: [address] [place] [address] MANAGEMENT  03/07: OTHER SURGICAL HISTORY      Comment: C3,4,5 disk repair  5/28 MANAGEMENT  No date: TONSILLECTOMY    SUBJECTIVE  Pt states \"Can I go back to bed?\"    Patient self-stated goal is to go home    OBJECTIVE  Precautions: Drain(s)    WEIGHT BEARING RESTRICTION  Weight Bearing Restriction: None                PAIN ASSESSME presents with deficits in cognition, balance, use of adaptive techniques impacting safety and independence in ADL/functional transfers. Pt with improvement in bed mobility, standing ADL.  Pt currently requires mod (I) for bed mobility, supervision for funct

## 2018-05-15 NOTE — PHYSICAL THERAPY NOTE
PHYSICAL THERAPY TREATMENT NOTE - INPATIENT    Room Number: 199/894-E     Session: 3   Number of Visits to Meet Established Goals: 5    Presenting Problem: Perforated Viscus, s/p exp lap for repair on 5/8/18     History related to current admission: Pt is METAL STENT (BMS)  2014: CATH DRUG ELUTING STENT  No date: CATH PERCUTANEOUS  TRANSLUMINAL CORONARY ANGIO*      Comment: most recent Jan/2015  No date: COLONOSCOPY  9/30/2013: DESTROY CERV/THOR FACET JNT      Comment: Procedure: CERVICAL RADIOFREQUENCY;  S PHYS PERFRMG SVC 5+ YR Bilateral      Comment: Procedure: STIMULATOR TRIAL EPIDURAL LEAD;                 Surgeon: Olga Guevara MD;  Location: [address] [place] [address] MANAGEMENT  03/07: OTHER SURGICAL HISTORY      Comment: C3,4,5 disk repair MANAGEMENT  No date: TONSILLECTOMY    SUBJECTIVE  \"I want to be in my own environment and sleep in my own bed\"      Patient’s self-stated goal is to return home    OBJECTIVE  Precautions: Drain(s)    WEIGHT BEARING RESTRICTION  Weight Bearing Restriction raising HOB. CGA sit>stand to RW. Pt amb to BR c RW and CGA. Supervision toilet t/f. Pt ind c checo care. Supervision sit>stand from toilet and SBA at sink for hand hygiene. Pt gait trained c RW and CGA to supervision.   Pt returned to sit in BS chair c sup with assist device: walker - rolling at assistance level: supervision      Goal #4     Goal #5     Goal #6     Goal Comments: Goals established on 5/9/2018  Ongoing

## 2018-05-15 NOTE — PLAN OF CARE
Pt a&o, VSS. On RA, no sob noted, IS utilized. Tele NSR 60's with O-PVC. Hypoactive bowel sounds, reports flatus. Abdomen rounded but soft. Midline incision with staples c/d/I, denies pain. OK noted to right side, scant amount output.  Tolerating soft diet,

## 2018-05-15 NOTE — PROGRESS NOTES
[address] [place] [address]  TEL: [phone]  FAX: [phone]    Bruno Phelps.  Patient Status:  Inpatient    12/10/1950 MRN [id number]   [facility] 3NW-A Attending Blaire Deshpande, [address] [place] [address] Day # 7 PCP Daria Speaks GFRAA  61  71  82   --    GFRNAA  53*  62  71   --    CA  8.7  8.5  8.5   --    ALB   --    --   2.2*   --    NA  140  140  139   --    K  4.4  3.5*  3.7  3.9   CL  105  105  105   --    CO2  29.0  29.0  26.0   --    ALKPHO   --    --   59   --    AST   -- MRSA Culture Only Once [[id number]] Collected: 05/08/18 1209   Order Status: Sent Lab Status: No result    Specimen: Other from Nares    Blood Culture Once [[id number]] Collected: 05/12/18 0156   Order Status: Completed Lab Status: Preliminary result Updated

## 2018-05-15 NOTE — CM/SW NOTE
Pt to transport via [id number] patient notified transport is not covered by insurance & chooses medicar over a cab. Pt/family are agreeable to the charges.  ETA for [employer] via [employer] is: 4:00 PM    Janine Zheng RN, [facility]    [phone]

## 2018-05-15 NOTE — CM/SW NOTE
05/15/18 1500   Discharge disposition   Expected discharge disposition Home or Self   Name of [facility] 1 services after discharge Skilled home care   Referrals provided Yes  ([facility] for care at home)   [address]

## 2018-05-15 NOTE — CM/SW NOTE
Spoke with pts daughter Sirisha Chacon - she did agree to check in on him frequently. He is set up with [facility] at the time of dc.      Leonidas Graham RN, [facility]    [phone] pgr: [phone]

## 2018-05-15 NOTE — PROGRESS NOTES
[facility]  Progress Note    Jennie Benjamin.  Patient Status:  Inpatient    12/10/1950 MRN [id number]   [facility] 3NW-A Attending Naveen Schuster MD   [address] Day # 7 PCP August Anne MD     Subjective:    Patient reports pain control by PT- can be dc home with Home health PT/OT  Possible home tonight/tomorrow  norco for dc home  f/u with Dr Clifford Berger in 1 week  PPI upon dc home        Espinoza Barriga [address] [facility]  5/15/2018

## 2018-05-15 NOTE — PHYSICAL THERAPY NOTE
PHYSICAL THERAPY TREATMENT NOTE - INPATIENT    Room Number: 378/137-O     Session: 2   Number of Visits to Meet Established Goals: 5    Presenting Problem: Perforated Viscus, s/p exp lap for repair on 5/8/18     History related to current admission: Pt is METAL STENT (BMS)  2014: CATH DRUG ELUTING STENT  No date: CATH PERCUTANEOUS  TRANSLUMINAL CORONARY ANGIO*      Comment: most recent Jan/2015  No date: COLONOSCOPY  9/30/2013: DESTROY CERV/THOR FACET JNT      Comment: Procedure: CERVICAL RADIOFREQUENCY;  S PHYS PERFRMG SVC 5+ YR Bilateral      Comment: Procedure: STIMULATOR TRIAL EPIDURAL LEAD;                 Surgeon: Chao Briseno MD;  Location: [address] [place] [address] MANAGEMENT  03/07: OTHER SURGICAL HISTORY      Comment: C3,4,5 disk repair MANAGEMENT  No date: TONSILLECTOMY    SUBJECTIVE  \"I want to go back to bed\"     Patient’s self-stated goal is to return home    OBJECTIVE  Precautions: Drain(s); Other (Comment) (Ng suction, abdominal binder for comfort)    WEIGHT BEARING RESTRICTION  We cues for use of grab bar. Pt tends to leave RW to the side during t/f , pt educated on safe mobility c AD and to keep RW within KALANI throughout t/f. Poor carry over. Pt ind c cheoc care.  Supervision sit>stand from toilet, pt amb to sink for hand hygiene and @ level: modified independent      Goal #2 Patient is able to demonstrate transfers EOB to/from [facility] at assistance level: modified independent      Goal #3 Patient is able to ambulate 150 feet with assist device: walker - rolling at assistance level: superv

## 2018-05-15 NOTE — PAYOR COMM NOTE
--------------  CONTINUED STAY REVIEW    [employer] [place] [employer]V [employer]  Subscriber #:  [id number]  Authorization Number: [id number]    Admit date: 5/8/18  Admit time: 0    Admitting Physician: Jorge Anne MD  Attending Physician:  MD CHELSI Pires 1 patch     Date Action Dose Route User    5/15/2018 0912 Patch Applied 1 patch Transdermal (Right Upper Arm) Fara Santos RN      Pantoprazole Sodium (PROTONIX) EC tab 40 mg     Date Action Dose Route User    5/15/2018 0925 Given 40 mg Oral Fara Santos

## 2018-05-15 NOTE — PROGRESS NOTES
[facility] Hospitalist Progress Note     PCP: Elba Deal MD    CC:  Follow up    SUBJECTIVE:  Doing well. Pain controlled. Reports that he gets chronic frontal HA. Tolerating PO, ambulating. OBJECTIVE:  Temp:  [97.6 °F (36.4 °C)-98.8 °F (37.1 °C)] 98. 30*  23*  20  13   --    CREATSERUM  1.35*  1.37*  1.21  1.08   --    CA  8.4  8.7  8.5  8.5   --    MG  1.8  2.2   --    --    --    GLU  103*  106*  112*  110*   --        Recent Labs   Lab  05/12/18   0156   ALT  9*   AST  18   ALB  2.2*       Recent La neuropathy and chronic neck and shoulder pain- stable, monitor.  Restarted gabapentin for now     **depression, anxiety-stable, continue home meds when able     **PPx-heparin subq     PT rec--> home with health PT  Hopeful d/c later today versus tomorrow

## 2018-05-25 PROBLEM — D75.839 THROMBOCYTOSIS: Status: ACTIVE | Noted: 2018-05-25

## 2018-05-25 PROBLEM — D64.9 ANEMIA, UNSPECIFIED TYPE: Status: ACTIVE | Noted: 2018-05-25

## 2018-05-30 NOTE — DISCHARGE SUMMARY
[facility]  Discharge Summary    Renee Frye.  Patient Status:  Inpatient    12/10/1950 MRN [id number]   [facility] 3NW-A Attending No att. providers found   [facility] Day # 7 PCP Jacquie Aceves MD     Date of Admission: 2018    Donald Condition: Good    Discharge Medications: Discharge Medication List as of 5/15/2018  4:13 PM    START taking these medications    HYDROcodone-acetaminophen 5-325 MG Oral Tab  Take 1 tablet by mouth every 4 (four) hours as needed. , Print, Ceoc-30 tablet, R- Disp-60 tablet, R-0    ALPRAZolam 0.5 MG Oral Tab  Take 0.5 mg by mouth nightly as needed for Sleep., Historical    CARVEDILOL 3.125 MG Oral Tab  TAKE ONE TABLET BY MOUTH TWICE DAILY WITH MEALS, Normal, Disp-180 tablet, R-0    TEMAZEPAM 30 MG Oral Cap  [place]PR[place]

## 2018-07-24 ENCOUNTER — LAB ENCOUNTER (OUTPATIENT)
Dept: LAB | Facility: HOSPITAL | Age: 68
End: 2018-07-24
Attending: INTERNAL MEDICINE
Payer: MEDICARE

## 2018-07-24 DIAGNOSIS — R19.7 DIARRHEA, UNSPECIFIED TYPE: ICD-10-CM

## 2018-07-24 PROCEDURE — 87493 C DIFF AMPLIFIED PROBE: CPT

## 2018-07-25 NOTE — PROGRESS NOTES
Stool c.difficile is (-)    Christina Lauren MD  [facility] G[facility]ology

## 2018-11-20 ENCOUNTER — HOSPITAL ENCOUNTER (OUTPATIENT)
Facility: HOSPITAL | Age: 68
Setting detail: OBSERVATION
Discharge: HOME | End: 2018-11-22
Attending: EMERGENCY MEDICINE | Admitting: INTERNAL MEDICINE
Payer: MEDICARE

## 2018-11-20 DIAGNOSIS — E87.5 HYPERKALEMIA: Primary | ICD-10-CM

## 2018-11-20 PROBLEM — N18.30 STAGE 3 CHRONIC KIDNEY DISEASE (HCC): Status: ACTIVE | Noted: 2018-11-20

## 2018-11-20 PROCEDURE — 85025 COMPLETE CBC W/AUTO DIFF WBC: CPT | Performed by: EMERGENCY MEDICINE

## 2018-11-20 PROCEDURE — 81003 URINALYSIS AUTO W/O SCOPE: CPT | Performed by: INTERNAL MEDICINE

## 2018-11-20 PROCEDURE — 93005 ELECTROCARDIOGRAM TRACING: CPT

## 2018-11-20 PROCEDURE — 96374 THER/PROPH/DIAG INJ IV PUSH: CPT

## 2018-11-20 PROCEDURE — 99285 EMERGENCY DEPT VISIT HI MDM: CPT

## 2018-11-20 PROCEDURE — 96375 TX/PRO/DX INJ NEW DRUG ADDON: CPT

## 2018-11-20 PROCEDURE — 80048 BASIC METABOLIC PNL TOTAL CA: CPT | Performed by: EMERGENCY MEDICINE

## 2018-11-20 PROCEDURE — 93010 ELECTROCARDIOGRAM REPORT: CPT

## 2018-11-20 RX ORDER — FLUTICASONE PROPIONATE 50 MCG
2 SPRAY, SUSPENSION (ML) NASAL DAILY
Status: DISCONTINUED | OUTPATIENT
Start: 2018-11-20 | End: 2018-11-22

## 2018-11-20 RX ORDER — DEXTROSE MONOHYDRATE 25 G/50ML
50 INJECTION, SOLUTION INTRAVENOUS ONCE
Status: COMPLETED | OUTPATIENT
Start: 2018-11-20 | End: 2018-11-20

## 2018-11-20 RX ORDER — DULOXETIN HYDROCHLORIDE 30 MG/1
60 CAPSULE, DELAYED RELEASE ORAL DAILY
Status: DISCONTINUED | OUTPATIENT
Start: 2018-11-20 | End: 2018-11-22

## 2018-11-20 RX ORDER — ALPRAZOLAM 0.5 MG/1
0.5 TABLET ORAL 2 TIMES DAILY PRN
COMMUNITY
End: 2018-01-01

## 2018-11-20 RX ORDER — SODIUM CHLORIDE 9 MG/ML
INJECTION, SOLUTION INTRAVENOUS CONTINUOUS
Status: DISCONTINUED | OUTPATIENT
Start: 2018-11-20 | End: 2018-11-22

## 2018-11-20 RX ORDER — LOPERAMIDE HYDROCHLORIDE 2 MG/1
2 CAPSULE ORAL 4 TIMES DAILY PRN
Status: DISCONTINUED | OUTPATIENT
Start: 2018-11-20 | End: 2018-11-22

## 2018-11-20 RX ORDER — PANTOPRAZOLE SODIUM 40 MG/1
40 TABLET, DELAYED RELEASE ORAL
Status: DISCONTINUED | OUTPATIENT
Start: 2018-11-21 | End: 2018-11-22

## 2018-11-20 RX ORDER — ATORVASTATIN CALCIUM 40 MG/1
40 TABLET, FILM COATED ORAL NIGHTLY
Status: DISCONTINUED | OUTPATIENT
Start: 2018-11-20 | End: 2018-11-22

## 2018-11-20 RX ORDER — FUROSEMIDE 10 MG/ML
20 INJECTION INTRAMUSCULAR; INTRAVENOUS ONCE
Status: DISCONTINUED | OUTPATIENT
Start: 2018-11-20 | End: 2018-11-20

## 2018-11-20 RX ORDER — CARVEDILOL 3.12 MG/1
3.12 TABLET ORAL 2 TIMES DAILY WITH MEALS
COMMUNITY
End: 2018-01-01

## 2018-11-20 RX ORDER — HEPARIN SODIUM 5000 [USP'U]/ML
5000 INJECTION, SOLUTION INTRAVENOUS; SUBCUTANEOUS EVERY 8 HOURS SCHEDULED
Status: DISCONTINUED | OUTPATIENT
Start: 2018-11-20 | End: 2018-11-22

## 2018-11-20 RX ORDER — CARVEDILOL 3.12 MG/1
3.12 TABLET ORAL 2 TIMES DAILY WITH MEALS
Status: DISCONTINUED | OUTPATIENT
Start: 2018-11-20 | End: 2018-11-22

## 2018-11-20 RX ORDER — ONDANSETRON 2 MG/ML
4 INJECTION INTRAMUSCULAR; INTRAVENOUS EVERY 6 HOURS PRN
Status: DISCONTINUED | OUTPATIENT
Start: 2018-11-20 | End: 2018-11-22

## 2018-11-20 RX ORDER — ACETAMINOPHEN 500 MG
1000 TABLET ORAL 2 TIMES DAILY PRN
Status: DISCONTINUED | OUTPATIENT
Start: 2018-11-20 | End: 2018-11-22

## 2018-11-20 RX ORDER — GABAPENTIN 400 MG/1
800 CAPSULE ORAL 2 TIMES DAILY PRN
Status: DISCONTINUED | OUTPATIENT
Start: 2018-11-20 | End: 2018-11-22

## 2018-11-20 RX ORDER — ALPRAZOLAM 0.5 MG/1
0.5 TABLET ORAL NIGHTLY PRN
Status: DISCONTINUED | OUTPATIENT
Start: 2018-11-20 | End: 2018-11-22

## 2018-11-20 RX ORDER — LISINOPRIL 10 MG/1
10 TABLET ORAL DAILY
Status: ON HOLD | COMMUNITY
End: 2018-11-22

## 2018-11-20 RX ORDER — METOCLOPRAMIDE HYDROCHLORIDE 5 MG/ML
5 INJECTION INTRAMUSCULAR; INTRAVENOUS EVERY 8 HOURS PRN
Status: DISCONTINUED | OUTPATIENT
Start: 2018-11-20 | End: 2018-11-22

## 2018-11-20 RX ORDER — SODIUM POLYSTYRENE SULFONATE 15 G/60ML
15 SUSPENSION ORAL; RECTAL ONCE
Status: COMPLETED | OUTPATIENT
Start: 2018-11-20 | End: 2018-11-20

## 2018-11-20 RX ORDER — CETIRIZINE HYDROCHLORIDE 10 MG/1
10 TABLET ORAL DAILY
Status: DISCONTINUED | OUTPATIENT
Start: 2018-11-20 | End: 2018-11-22

## 2018-11-20 NOTE — ED INITIAL ASSESSMENT (HPI)
Per patient, had blood work drawn at the doctor today and was told to come in for a potassium level of 6.2. Denies chest pain, trouble breathing or any other complaints at this time.

## 2018-11-21 PROCEDURE — 97535 SELF CARE MNGMENT TRAINING: CPT

## 2018-11-21 PROCEDURE — 84132 ASSAY OF SERUM POTASSIUM: CPT | Performed by: INTERNAL MEDICINE

## 2018-11-21 PROCEDURE — 93010 ELECTROCARDIOGRAM REPORT: CPT | Performed by: INTERNAL MEDICINE

## 2018-11-21 PROCEDURE — 80048 BASIC METABOLIC PNL TOTAL CA: CPT | Performed by: INTERNAL MEDICINE

## 2018-11-21 PROCEDURE — 93005 ELECTROCARDIOGRAM TRACING: CPT

## 2018-11-21 PROCEDURE — 97165 OT EVAL LOW COMPLEX 30 MIN: CPT

## 2018-11-21 RX ORDER — FUROSEMIDE 10 MG/ML
40 INJECTION INTRAMUSCULAR; INTRAVENOUS ONCE
Status: COMPLETED | OUTPATIENT
Start: 2018-11-21 | End: 2018-11-21

## 2018-11-21 RX ORDER — DEXTROSE MONOHYDRATE 25 G/50ML
50 INJECTION, SOLUTION INTRAVENOUS ONCE
Status: COMPLETED | OUTPATIENT
Start: 2018-11-21 | End: 2018-11-21

## 2018-11-21 RX ORDER — SODIUM POLYSTYRENE SULFONATE 15 G/60ML
30 SUSPENSION ORAL; RECTAL ONCE
Status: COMPLETED | OUTPATIENT
Start: 2018-11-21 | End: 2018-11-21

## 2018-11-21 RX ORDER — TEMAZEPAM 15 MG/1
15 CAPSULE ORAL NIGHTLY PRN
Status: DISCONTINUED | OUTPATIENT
Start: 2018-11-21 | End: 2018-11-22

## 2018-11-21 NOTE — CM/SW NOTE
11/21/18 1600   CM/SW Referral Data   Referral Source    Reason for Referral Discharge planning   Informant Patient   Pertinent Medical Hx   Primary Care Physician Name Dr. Margaret Loo   Patient Info   Patient's Mental Status Alert;Oriented   Madison Uriostegui

## 2018-11-21 NOTE — H&P
[facility] Hospitalist H&P       CC: Patient presents with:  Hyperkalemia       PCP: Karen Suazo MD    History of Present Illness:  Mr. Carlos Carvajal is a 78 yo male with PMH of CAD c/p PCI, prior GI bleed, squamous cell lung cancer stage 1 s/p RUL lobectomy and rese FACET INJECTION Bilateral 5/28/2013    Performed by Edelmira Sargent MD at [address] [facility] N/A 9/30/2013    Performed by Edelmira Sargent MD at [address]   • COLONOSCOPY     • ENDOBRONCHIAL ULTR Used      Tobacco comment: >45 pack yr hx documented in md note    Alcohol use: No      Alcohol/week: 0.0 oz      Comment: h/o ETOH abuse--none since 1992       Fam Hx  No family history on file.     Review of Systems  12 point ROS negative, except as state chest were obtained from the thoracic inlet through the adrenal glands. The data was reconstructed into 1.25 mm collimated axial images.  CONTRAST: None Automated exposure control and ALARA manual techniques for patient specific dose reduction were followed IMAGING PROTOCOL: Routine nonenhanced multiplanar MR sequences of the entire thoracic spine were obtained, using a closed 1. 5-Radha multichannel MR system.  THE THORACIC VERTEBRA AND PARAVERTEBRAL SOFT TISSUES: Aside from stable chronic mild anterior wedge- history and medications.      Diya Carbajal  Internal Medicine  [facility]

## 2018-11-21 NOTE — PROGRESS NOTES
11/21/18 1324   Clinical Encounter Type   Visited With Patient   Routine Visit Introduction   Continue Visiting No   Patient's Supportive Strategies/Resources  provided emotional support to patient.    Patient Spiritual Encounters   Spiritual Ass

## 2018-11-21 NOTE — PROGRESS NOTES
Pt is a 80 y/o male admitted with hyperkalemia. alert oriented. denies SOB pain,fever and N/V.up as tolerated. pts k level is 5. 9. MD notified. getting ivf.low k diet. 10 units of Reg insulin,D50,lasix 40iv and calcium ivpb given.   pts home meds is with [facility]

## 2018-11-21 NOTE — PAYOR COMM NOTE
--------------  ADMISSION REVIEW         11/20    ED  Stated Complaint: hyperkalemia     HPI     Patient is a 19-year-old male, history of coronary artery disease status post MI with stents, E EF, on multiple cardiac medications here for hyperkalemia.   He

## 2018-11-21 NOTE — PLAN OF CARE
GASTROINTESTINAL - ADULT    • Maintains or returns to baseline bowel function Progressing    • Maintains adequate nutritional intake (undernourished) Progressing    • Achieves appropriate nutritional intake (bariatric) Progressing        METABOLIC/FLUID AN

## 2018-11-21 NOTE — PROGRESS NOTES
Pharmacy Note: Renal dose adjustment for Metoclopramide (Reglan)  Marito Pillai. has been prescribed Metoclopramide (Reglan) 10 mg every 8 hours as needed. Estimated Creatinine Clearance: 36.7 mL/min (A) (based on SCr of 1.57 mg/dL (H)).     His calcul

## 2018-11-21 NOTE — CONSULTS
[facility] - Nephrology  Report of Consultation    Danielito Woods.  Patient Status:  Observation    12/10/1950 MRN [id number]   [facility] 5NW-A Attending Eva Hughes MD   Hosp Day # 0 PCP Truman Dubon MD     Reason for co hyperlipidemia    • Vertigo     11/2014   • Visual impairment     Rt eye severe impairment     Past Surgical History:   Procedure Laterality Date   • BRONCHOSCOPY N/A 4/8/2015    Performed by Digna Amado MD at [facility] MAIN OR   • SAAD CHACKO [address] [place] [address] ([facility] [place] FOR PAIN MANAGEMENT   • THORACOTOMY Right 6/19/2015    Performed by Josie Macdonald, Quin Nair MD at [address]       History reviewed. No pertinent family history. reports that he has been smoking cigarettes.   He has a 45.00 pack-year s total) by mouth 4 (four) times daily as needed. for 30 days Disp: 120 tablet Rfl: 5 11/20/2018 at 0900   cetirizine (ZYRTEC ALLERGY) 10 MG Oral Tab Take 10 mg by mouth daily.  Disp:  Rfl:  11/19/2018 at 090   Fluticasone Furoate (FLONASE SENSIMIST) 27.5 MCG 137 lb (62.1 kg)  11/16/18 : 134 lb (60.8 kg)      General: pleasant, well appearing  HEENT: NCAT  Neck: Supple  Cardiac: Regular rate and rhythm, no murmurs  Lungs: CTAB, no crackles   Abdomen: Soft, non-tender, non-distended  Extremities: no LE edema   N 11/20/2018    BLOODURINE Negative 11/20/2018    PHURINE 5.0 11/20/2018    PROUR Negative 11/20/2018    UROBILINOGEN <2.0 11/20/2018    NITRITE Negative 11/20/2018    LEUUR Negative 11/20/2018    NMIC Microscopic not indicated 11/20/2018    WBCUR 1-4 05/08/

## 2018-11-21 NOTE — ED PROVIDER NOTES
Patient Seen in: [facility] Emergency Department    History   Patient presents with:  Hyperkalemia    Stated Complaint: hyperkalemia    HPI    Patient is a 15-year-old male, history of coronary artery disease status post MI with stents, E EF, on mul Boubacar Narayanan MD at [address] [facility] N/A 9/30/2013    Performed by Boubacar Narayanan MD at [address] [facility]   • COLONOSCOPY     • ENDOBRONCHIAL ULTRASOUND (EBUS) N/A 4/8/2015    Performed by Deondre Mccracken complaint: hyperkalemia  Other systems are as noted in HPI. Constitutional and vital signs reviewed. All other systems reviewed and negative except as noted above.     Physical Exam     ED Triage Vitals [11/20/18 1731]   /85   Pulse 72   Resp 12 ---------                               -----------         ------                     CBC W/ DIFFERENTIAL[[id number]]          Abnormal            Final result                 Please view results for these tests on the individual orders.    RAINBOW DRAW B

## 2018-11-21 NOTE — PROGRESS NOTES
NURSING ADMISSION NOTE      Patient admitted via Cart  Oriented to room. Safety precautions initiated. Bed in low position. Call light in reach. Admission navigator completed. Comfortable in recliner chair, no pain or SOB noted.  Will continue to

## 2018-11-21 NOTE — PROGRESS NOTES
[facility] Hospitalist Progress Note     [facility]      SUBJECTIVE:  Feeling well  No CP or SOB  K still high this AM    OBJECTIVE:  Temp:  [97.8 °F (36.6 °C)-99 °F (37.2 °C)] 97.8 °F (36.6 °C)  Pulse:  [64-92] 75  Resp:  [12-16] 16  BP: ()/(49-98) HEART/AORTA: The thoracic aorta is nonaneurysmal.  There is no significant pericardial effusion. MEDIASTINUM/DMITRY: There are stable scattered mildly prominent mediastinal lymph nodes.  A subcarinal lymph node measures 1.1 cm in short axis (series 4 image 58 the thoracic vertebrae maintain normal height and alignment, without acute fractures or compression deformities.  The thoracic vertebral bone marrow signal is intact, without diffuse marrow signal replacement, marrow edema, endplate erosions, or discrete ma infusion  Intravenous Continuous   Heparin Sodium (Porcine) 5000 UNIT/ML injection 5,000 Units 5,000 Units Subcutaneous Q8H [facility]   ondansetron HCl (ZOFRAN) injection 4 mg 4 mg Intravenous Q6H PRN   Metoclopramide HCl (REGLAN) injection 5 mg 5 mg Intravenous

## 2018-11-21 NOTE — PLAN OF CARE
Problem: Patient/Family Goals  Goal: Patient/Family Short Term Goal  Patient's Short Term Goal:   11/20noc: decrease episodes of diarrhea  11/21-resolve hyperkalemia    Interventions:   11/21-monitor k,up as tolerated.   - PRN Imodium  -commode at bedside

## 2018-11-21 NOTE — OCCUPATIONAL THERAPY NOTE
OCCUPATIONAL THERAPY QUICK EVALUATION - INPATIENT    Room Number: 712/219-D  Evaluation Date: 11/21/2018     Type of Evaluation: Initial  Presenting Problem: COPD    Physician Order: IP Consult to Occupational Therapy  Reason for Therapy:  ADL/IADL Dysfunc Performed by Boubacar Narayanan MD at [address] N/A 9/30/2013    Performed by Boubacar Narayanan MD at [facility]   • COLONOSCOPY     • ENDOBRONCHIAL ULTRASOUND (EBUS) N/A 4/8/2015    Performed difficulty getting into and out of tub. SUBJECTIVE   Pt stated, \"I am doing good, I talk a lot. \"    Patient self-stated goal is to go home    OBJECTIVE  Precautions: None  Fall Risk: Standard fall risk    WEIGHT BEARING RESTRICTION  Weight Bearing Res noted above. Functional outcome measures completed include AMPAC, MMT, ROM. In this OT eval patient performed all ADL tasks, functional transfers, dynamic reaching and functional mobility safely, without loss of balance, and at supervision level or above.

## 2018-11-22 VITALS
DIASTOLIC BLOOD PRESSURE: 72 MMHG | RESPIRATION RATE: 16 BRPM | BODY MASS INDEX: 24.61 KG/M2 | HEIGHT: 63 IN | SYSTOLIC BLOOD PRESSURE: 139 MMHG | WEIGHT: 138.88 LBS | HEART RATE: 78 BPM | TEMPERATURE: 98 F | OXYGEN SATURATION: 95 %

## 2018-11-22 PROCEDURE — 80048 BASIC METABOLIC PNL TOTAL CA: CPT | Performed by: INTERNAL MEDICINE

## 2018-11-22 RX ORDER — GABAPENTIN 400 MG/1
800 CAPSULE ORAL 4 TIMES DAILY
Status: DISCONTINUED | OUTPATIENT
Start: 2018-11-22 | End: 2018-11-22

## 2018-11-22 NOTE — PLAN OF CARE
GASTROINTESTINAL - ADULT    • Maintains or returns to baseline bowel function Progressing    • Maintains adequate nutritional intake (undernourished) Progressing    • Achieves appropriate nutritional intake (bariatric) Progressing        Impaired Activitie

## 2018-11-22 NOTE — PROGRESS NOTES
[facility] Hospitalist Progress Note     [facility]      SUBJECTIVE:  Pt upset about diaper. Feeling ok.  Didn't sleep well    OBJECTIVE:  Temp:  [97.6 °F (36.4 °C)-98 °F (36.7 °C)] 97.6 °F (36.4 °C)  Pulse:  [67-92] 78  Resp:  [16] 16  BP: (131-149)/(68-80 specific dose reduction were followed while maintaining the necessary diagnostic image quality. ADVERSE REACTION: None. FINDINGS:  HEART/AORTA: The thoracic aorta is nonaneurysmal.  There is no significant pericardial effusion.  MEDIASTINUM/DMITRY: There are stable chronic mild anterior wedge-like deformities of the T7 and 8 vertebral bodies, associated with partial vertebral ankylosis, the thoracic vertebrae maintain normal height and alignment, without acute fractures or compression deformities.  The thoracic nasal spray 2 spray 2 spray Each Nare Daily   gabapentin (NEURONTIN) cap 800 mg 800 mg Oral BID PRN   Pantoprazole Sodium (PROTONIX) EC tab 40 mg 40 mg Oral BID AC   0.9%  NaCl infusion  Intravenous Continuous   Heparin Sodium (Porcine) 5000 UNIT/ML inject

## 2018-11-22 NOTE — CM/SW NOTE
MD orders received for discharge home today. RN alerted SW pt will need ride arranged. SW spoke with pt who requests medicar and is aware of private pay charges.  [facility] to provide [employer] for [facility] (ambulance at [facility]) with a 4pm pick

## 2018-11-22 NOTE — PROGRESS NOTES
[address]  Nephrology Progress Note    Sierra Macedo.  Attending:  Kaleb Sharma MD       SUBJECTIVE:     Potassium normalized  Needs a ride home    PHYSICAL EXAM:     Vital Signs: /72 (BP Location: Left arm)   Pulse 78   Temp 97.6 °F (36.4 09/19/2016    LYMPH 10 09/19/2016    MON 4 09/19/2016    EOS 0 09/19/2016    BASO 0 09/19/2016    NEPERCENT 59.7 11/20/2018    LYPERCENT 27.7 11/20/2018    MOPERCENT 9.2 11/20/2018    EOPERCENT 2.5 11/20/2018    BAPERCENT 0.8 11/20/2018    NE 4.54 11/20/20 Subcutaneous Q8H [address]   ondansetron HCl (ZOFRAN) injection 4 mg 4 mg Intravenous Q6H PRN   Metoclopramide HCl (REGLAN) injection 5 mg 5 mg Intravenous Q8H PRN   Loperamide HCl (IMODIUM) cap 2 mg 2 mg Oral QID PRN       ASSESSMENT/PLAN:     Mariaelena Mckeon.

## 2018-11-22 NOTE — PROGRESS NOTES
NURSING DISCHARGE NOTE    Discharged Home via Ambulance. Accompanied by Support staff  Belongings Taken by patient/family. DC HOME VIA [employer]IC[employer]. IV REMOVED. HOME MEDICATIONS RETURNED TO PATIENT FROM PHARMACY. OK FOR DC PER HOSPITALIST AND RENAL.  DC IN

## 2018-11-23 NOTE — CM/SW NOTE
Patient discharged on 11/22/18 with no further needs identified.        11/23/18 0800   Discharge disposition   Expected discharge disposition Home or Self   Discharge transportation [facility]

## 2019-01-01 ENCOUNTER — HOSPITAL ENCOUNTER (INPATIENT)
Dept: INTERVENTIONAL RADIOLOGY/VASCULAR | Facility: HOSPITAL | Age: 69
LOS: 2 days | Discharge: HOME | DRG: 683 | End: 2019-01-01
Attending: INTERNAL MEDICINE | Admitting: INTERNAL MEDICINE
Payer: MEDICARE

## 2019-01-01 ENCOUNTER — HOSPITAL ENCOUNTER (OUTPATIENT)
Dept: INTERVENTIONAL RADIOLOGY/VASCULAR | Facility: HOSPITAL | Age: 69
Setting detail: OBSERVATION
Discharge: HOME | End: 2019-01-01
Attending: INTERNAL MEDICINE | Admitting: INTERNAL MEDICINE
Payer: MEDICARE

## 2019-01-01 ENCOUNTER — APPOINTMENT (OUTPATIENT)
Dept: GENERAL RADIOLOGY | Facility: HOSPITAL | Age: 69
DRG: 190 | End: 2019-01-01
Attending: EMERGENCY MEDICINE
Payer: MEDICARE

## 2019-01-01 ENCOUNTER — APPOINTMENT (OUTPATIENT)
Dept: CV DIAGNOSTICS | Facility: HOSPITAL | Age: 69
DRG: 190 | End: 2019-01-01
Attending: INTERNAL MEDICINE
Payer: MEDICARE

## 2019-01-01 ENCOUNTER — HOSPITAL ENCOUNTER (INPATIENT)
Facility: HOSPITAL | Age: 69
LOS: 4 days | Discharge: HOME | DRG: 190 | End: 2019-01-01
Attending: EMERGENCY MEDICINE | Admitting: INTERNAL MEDICINE
Payer: MEDICARE

## 2019-01-01 ENCOUNTER — APPOINTMENT (OUTPATIENT)
Dept: CT IMAGING | Facility: HOSPITAL | Age: 69
DRG: 190 | End: 2019-01-01
Attending: HOSPITALIST
Payer: MEDICARE

## 2019-01-01 ENCOUNTER — APPOINTMENT (OUTPATIENT)
Dept: INTERVENTIONAL RADIOLOGY/VASCULAR | Facility: HOSPITAL | Age: 69
End: 2019-01-01
Attending: INTERNAL MEDICINE
Payer: MEDICARE

## 2019-01-01 ENCOUNTER — APPOINTMENT (OUTPATIENT)
Dept: ULTRASOUND IMAGING | Facility: HOSPITAL | Age: 69
DRG: 683 | End: 2019-01-01
Attending: INTERNAL MEDICINE
Payer: MEDICARE

## 2019-01-01 VITALS
HEIGHT: 66 IN | RESPIRATION RATE: 19 BRPM | SYSTOLIC BLOOD PRESSURE: 135 MMHG | TEMPERATURE: 97 F | WEIGHT: 168 LBS | OXYGEN SATURATION: 94 % | DIASTOLIC BLOOD PRESSURE: 66 MMHG | HEART RATE: 77 BPM | BODY MASS INDEX: 27 KG/M2

## 2019-01-01 VITALS
SYSTOLIC BLOOD PRESSURE: 138 MMHG | OXYGEN SATURATION: 95 % | TEMPERATURE: 98 F | RESPIRATION RATE: 19 BRPM | HEART RATE: 76 BPM | BODY MASS INDEX: 26.75 KG/M2 | HEIGHT: 66 IN | WEIGHT: 166.44 LBS | DIASTOLIC BLOOD PRESSURE: 64 MMHG

## 2019-01-01 VITALS
HEART RATE: 95 BPM | HEIGHT: 66 IN | WEIGHT: 158.75 LBS | TEMPERATURE: 99 F | SYSTOLIC BLOOD PRESSURE: 144 MMHG | DIASTOLIC BLOOD PRESSURE: 77 MMHG | RESPIRATION RATE: 18 BRPM | OXYGEN SATURATION: 98 % | BODY MASS INDEX: 25.51 KG/M2

## 2019-01-01 DIAGNOSIS — J44.1 COPD WITH ACUTE EXACERBATION (HCC): ICD-10-CM

## 2019-01-01 DIAGNOSIS — N17.9 AKI (ACUTE KIDNEY INJURY) (HCC): Primary | ICD-10-CM

## 2019-01-01 DIAGNOSIS — I50.9 CONGESTIVE HEART FAILURE, UNSPECIFIED HF CHRONICITY, UNSPECIFIED HEART FAILURE TYPE (HCC): ICD-10-CM

## 2019-01-01 DIAGNOSIS — I25.10 CAD (CORONARY ARTERY DISEASE): ICD-10-CM

## 2019-01-01 DIAGNOSIS — I50.9 ACUTE ON CHRONIC CONGESTIVE HEART FAILURE, UNSPECIFIED HEART FAILURE TYPE (HCC): Primary | ICD-10-CM

## 2019-01-01 DIAGNOSIS — I27.20 PULMONARY HTN (HCC): ICD-10-CM

## 2019-01-01 DIAGNOSIS — I50.22 CHRONIC SYSTOLIC CHF (CONGESTIVE HEART FAILURE) (HCC): ICD-10-CM

## 2019-01-01 DIAGNOSIS — I48.0 PAF (PAROXYSMAL ATRIAL FIBRILLATION) (HCC): ICD-10-CM

## 2019-01-01 DIAGNOSIS — I50.9 CHF (CONGESTIVE HEART FAILURE) (HCC): ICD-10-CM

## 2019-01-01 DIAGNOSIS — R94.30 ABNORMAL CARDIAC FUNCTION TEST: ICD-10-CM

## 2019-01-01 LAB
ANION GAP SERPL CALC-SCNC: 8 MMOL/L (ref 0–18)
ANION GAP SERPL CALC-SCNC: 8 MMOL/L (ref 0–18)
BASOPHILS # BLD AUTO: 0.08 X10(3) UL (ref 0–0.2)
BASOPHILS NFR BLD AUTO: 1.3 %
BUN BLD-MCNC: 43 MG/DL (ref 7–18)
BUN BLD-MCNC: 47 MG/DL (ref 7–18)
BUN/CREAT SERPL: 28.3 (ref 10–20)
BUN/CREAT SERPL: 32.6 (ref 10–20)
CALCIUM BLD-MCNC: 9.3 MG/DL (ref 8.5–10.1)
CALCIUM BLD-MCNC: 9.4 MG/DL (ref 8.5–10.1)
CHLORIDE SERPL-SCNC: 106 MMOL/L (ref 98–112)
CHLORIDE SERPL-SCNC: 107 MMOL/L (ref 98–112)
CO2 SERPL-SCNC: 21 MMOL/L (ref 21–32)
CO2 SERPL-SCNC: 25 MMOL/L (ref 21–32)
CREAT BLD-MCNC: 1.44 MG/DL (ref 0.7–1.3)
CREAT BLD-MCNC: 1.52 MG/DL (ref 0.7–1.3)
DEPRECATED RDW RBC AUTO: 48.2 FL (ref 35.1–46.3)
EOSINOPHIL # BLD AUTO: 0.19 X10(3) UL (ref 0–0.7)
EOSINOPHIL NFR BLD AUTO: 3 %
ERYTHROCYTE [DISTWIDTH] IN BLOOD BY AUTOMATED COUNT: 14.7 % (ref 11–15)
GLUCOSE BLD-MCNC: 85 MG/DL (ref 70–99)
GLUCOSE BLD-MCNC: 87 MG/DL (ref 70–99)
HCT VFR BLD AUTO: 40.3 % (ref 39–53)
HGB BLD-MCNC: 12.7 G/DL (ref 13–17.5)
IMM GRANULOCYTES # BLD AUTO: 0.01 X10(3) UL (ref 0–1)
IMM GRANULOCYTES NFR BLD: 0.2 %
LYMPHOCYTES # BLD AUTO: 2.03 X10(3) UL (ref 1–4)
LYMPHOCYTES NFR BLD AUTO: 32.4 %
MCH RBC QN AUTO: 28.5 PG (ref 26–34)
MCHC RBC AUTO-ENTMCNC: 31.5 G/DL (ref 31–37)
MCV RBC AUTO: 90.4 FL (ref 80–100)
MONOCYTES # BLD AUTO: 0.7 X10(3) UL (ref 0.1–1)
MONOCYTES NFR BLD AUTO: 11.2 %
NEUTROPHILS # BLD AUTO: 3.25 X10 (3) UL (ref 1.5–7.7)
NEUTROPHILS # BLD AUTO: 3.25 X10(3) UL (ref 1.5–7.7)
NEUTROPHILS NFR BLD AUTO: 51.9 %
OSMOLALITY SERPL CALC.SUM OF ELEC: 292 MOSM/KG (ref 275–295)
OSMOLALITY SERPL CALC.SUM OF ELEC: 300 MOSM/KG (ref 275–295)
PLATELET # BLD AUTO: 240 10(3)UL (ref 150–450)
POTASSIUM SERPL-SCNC: 4.8 MMOL/L (ref 3.5–5.1)
POTASSIUM SERPL-SCNC: 5.6 MMOL/L (ref 3.5–5.1)
RBC # BLD AUTO: 4.46 X10(6)UL (ref 3.8–5.8)
SODIUM SERPL-SCNC: 136 MMOL/L (ref 136–145)
SODIUM SERPL-SCNC: 139 MMOL/L (ref 136–145)
WBC # BLD AUTO: 6.3 X10(3) UL (ref 4–11)

## 2019-01-01 PROCEDURE — 80048 BASIC METABOLIC PNL TOTAL CA: CPT | Performed by: INTERNAL MEDICINE

## 2019-01-01 PROCEDURE — 84300 ASSAY OF URINE SODIUM: CPT | Performed by: INTERNAL MEDICINE

## 2019-01-01 PROCEDURE — 87427 SHIGA-LIKE TOXIN AG IA: CPT | Performed by: HOSPITALIST

## 2019-01-01 PROCEDURE — 94640 AIRWAY INHALATION TREATMENT: CPT

## 2019-01-01 PROCEDURE — 84484 ASSAY OF TROPONIN QUANT: CPT | Performed by: EMERGENCY MEDICINE

## 2019-01-01 PROCEDURE — 85610 PROTHROMBIN TIME: CPT | Performed by: EMERGENCY MEDICINE

## 2019-01-01 PROCEDURE — 82962 GLUCOSE BLOOD TEST: CPT

## 2019-01-01 PROCEDURE — 93010 ELECTROCARDIOGRAM REPORT: CPT

## 2019-01-01 PROCEDURE — 97116 GAIT TRAINING THERAPY: CPT

## 2019-01-01 PROCEDURE — 84439 ASSAY OF FREE THYROXINE: CPT | Performed by: HOSPITALIST

## 2019-01-01 PROCEDURE — 93010 ELECTROCARDIOGRAM REPORT: CPT | Performed by: INTERNAL MEDICINE

## 2019-01-01 PROCEDURE — 81001 URINALYSIS AUTO W/SCOPE: CPT | Performed by: INTERNAL MEDICINE

## 2019-01-01 PROCEDURE — 80053 COMPREHEN METABOLIC PANEL: CPT | Performed by: INTERNAL MEDICINE

## 2019-01-01 PROCEDURE — 97161 PT EVAL LOW COMPLEX 20 MIN: CPT

## 2019-01-01 PROCEDURE — 4A023N7 MEASUREMENT OF CARDIAC SAMPLING AND PRESSURE, LEFT HEART, PERCUTANEOUS APPROACH: ICD-10-PCS | Performed by: INTERNAL MEDICINE

## 2019-01-01 PROCEDURE — 85027 COMPLETE CBC AUTOMATED: CPT | Performed by: INTERNAL MEDICINE

## 2019-01-01 PROCEDURE — 96375 TX/PRO/DX INJ NEW DRUG ADDON: CPT

## 2019-01-01 PROCEDURE — 83735 ASSAY OF MAGNESIUM: CPT | Performed by: INTERNAL MEDICINE

## 2019-01-01 PROCEDURE — 85025 COMPLETE CBC W/AUTO DIFF WBC: CPT | Performed by: HOSPITALIST

## 2019-01-01 PROCEDURE — 93306 TTE W/DOPPLER COMPLETE: CPT | Performed by: INTERNAL MEDICINE

## 2019-01-01 PROCEDURE — 96374 THER/PROPH/DIAG INJ IV PUSH: CPT

## 2019-01-01 PROCEDURE — 84132 ASSAY OF SERUM POTASSIUM: CPT | Performed by: INTERNAL MEDICINE

## 2019-01-01 PROCEDURE — 36415 COLL VENOUS BLD VENIPUNCTURE: CPT

## 2019-01-01 PROCEDURE — 81003 URINALYSIS AUTO W/O SCOPE: CPT | Performed by: INTERNAL MEDICINE

## 2019-01-01 PROCEDURE — 80061 LIPID PANEL: CPT | Performed by: INTERNAL MEDICINE

## 2019-01-01 PROCEDURE — 84481 FREE ASSAY (FT-3): CPT | Performed by: HOSPITALIST

## 2019-01-01 PROCEDURE — B211YZZ FLUOROSCOPY OF MULTIPLE CORONARY ARTERIES USING OTHER CONTRAST: ICD-10-PCS | Performed by: INTERNAL MEDICINE

## 2019-01-01 PROCEDURE — 80069 RENAL FUNCTION PANEL: CPT | Performed by: INTERNAL MEDICINE

## 2019-01-01 PROCEDURE — 82310 ASSAY OF CALCIUM: CPT | Performed by: INTERNAL MEDICINE

## 2019-01-01 PROCEDURE — 87045 FECES CULTURE AEROBIC BACT: CPT | Performed by: HOSPITALIST

## 2019-01-01 PROCEDURE — 87046 STOOL CULTR AEROBIC BACT EA: CPT | Performed by: HOSPITALIST

## 2019-01-01 PROCEDURE — 76775 US EXAM ABDO BACK WALL LIM: CPT | Performed by: INTERNAL MEDICINE

## 2019-01-01 PROCEDURE — 93005 ELECTROCARDIOGRAM TRACING: CPT

## 2019-01-01 PROCEDURE — 94664 DEMO&/EVAL PT USE INHALER: CPT

## 2019-01-01 PROCEDURE — 99152 MOD SED SAME PHYS/QHP 5/>YRS: CPT

## 2019-01-01 PROCEDURE — 84100 ASSAY OF PHOSPHORUS: CPT | Performed by: INTERNAL MEDICINE

## 2019-01-01 PROCEDURE — 82550 ASSAY OF CK (CPK): CPT | Performed by: INTERNAL MEDICINE

## 2019-01-01 PROCEDURE — 97530 THERAPEUTIC ACTIVITIES: CPT

## 2019-01-01 PROCEDURE — 93458 L HRT ARTERY/VENTRICLE ANGIO: CPT

## 2019-01-01 PROCEDURE — 97165 OT EVAL LOW COMPLEX 30 MIN: CPT

## 2019-01-01 PROCEDURE — 83970 ASSAY OF PARATHORMONE: CPT | Performed by: INTERNAL MEDICINE

## 2019-01-01 PROCEDURE — B215YZZ FLUOROSCOPY OF LEFT HEART USING OTHER CONTRAST: ICD-10-PCS | Performed by: INTERNAL MEDICINE

## 2019-01-01 PROCEDURE — 82565 ASSAY OF CREATININE: CPT | Performed by: INTERNAL MEDICINE

## 2019-01-01 PROCEDURE — 70450 CT HEAD/BRAIN W/O DYE: CPT | Performed by: HOSPITALIST

## 2019-01-01 PROCEDURE — 82570 ASSAY OF URINE CREATININE: CPT | Performed by: INTERNAL MEDICINE

## 2019-01-01 PROCEDURE — 99285 EMERGENCY DEPT VISIT HI MDM: CPT

## 2019-01-01 PROCEDURE — 85025 COMPLETE CBC W/AUTO DIFF WBC: CPT | Performed by: EMERGENCY MEDICINE

## 2019-01-01 PROCEDURE — 85610 PROTHROMBIN TIME: CPT | Performed by: INTERNAL MEDICINE

## 2019-01-01 PROCEDURE — 84443 ASSAY THYROID STIM HORMONE: CPT | Performed by: HOSPITALIST

## 2019-01-01 PROCEDURE — 83880 ASSAY OF NATRIURETIC PEPTIDE: CPT | Performed by: EMERGENCY MEDICINE

## 2019-01-01 PROCEDURE — 97535 SELF CARE MNGMENT TRAINING: CPT

## 2019-01-01 PROCEDURE — 80053 COMPREHEN METABOLIC PANEL: CPT | Performed by: EMERGENCY MEDICINE

## 2019-01-01 PROCEDURE — 87493 C DIFF AMPLIFIED PROBE: CPT | Performed by: HOSPITALIST

## 2019-01-01 PROCEDURE — 71045 X-RAY EXAM CHEST 1 VIEW: CPT | Performed by: EMERGENCY MEDICINE

## 2019-01-01 PROCEDURE — 85025 COMPLETE CBC W/AUTO DIFF WBC: CPT | Performed by: INTERNAL MEDICINE

## 2019-01-01 PROCEDURE — 36415 COLL VENOUS BLD VENIPUNCTURE: CPT | Performed by: INTERNAL MEDICINE

## 2019-01-01 PROCEDURE — 85730 THROMBOPLASTIN TIME PARTIAL: CPT | Performed by: EMERGENCY MEDICINE

## 2019-01-01 RX ORDER — DULOXETIN HYDROCHLORIDE 30 MG/1
60 CAPSULE, DELAYED RELEASE ORAL DAILY
Status: DISCONTINUED | OUTPATIENT
Start: 2019-01-01 | End: 2019-01-01

## 2019-01-01 RX ORDER — LISINOPRIL 10 MG/1
10 TABLET ORAL DAILY
Qty: 30 TABLET | Refills: 0 | Status: ON HOLD | OUTPATIENT
Start: 2019-01-01 | End: 2019-01-01

## 2019-01-01 RX ORDER — ENOXAPARIN SODIUM 100 MG/ML
40 INJECTION SUBCUTANEOUS EVERY EVENING
Status: DISCONTINUED | OUTPATIENT
Start: 2019-01-01 | End: 2019-01-01

## 2019-01-01 RX ORDER — FUROSEMIDE 10 MG/ML
20 INJECTION INTRAMUSCULAR; INTRAVENOUS
Status: DISCONTINUED | OUTPATIENT
Start: 2019-01-01 | End: 2019-01-01

## 2019-01-01 RX ORDER — PRAMIPEXOLE DIHYDROCHLORIDE 0.25 MG/1
0.25 TABLET ORAL ONCE AS NEEDED
Status: COMPLETED | OUTPATIENT
Start: 2019-01-01 | End: 2019-01-01

## 2019-01-01 RX ORDER — SODIUM CHLORIDE 9 MG/ML
INJECTION, SOLUTION INTRAVENOUS
Status: DISCONTINUED | OUTPATIENT
Start: 2019-01-01 | End: 2019-01-01 | Stop reason: HOSPADM

## 2019-01-01 RX ORDER — HEPARIN SODIUM 5000 [USP'U]/ML
5000 INJECTION, SOLUTION INTRAVENOUS; SUBCUTANEOUS EVERY 8 HOURS SCHEDULED
Status: DISCONTINUED | OUTPATIENT
Start: 2019-01-01 | End: 2019-01-01

## 2019-01-01 RX ORDER — NICOTINE 21 MG/24HR
1 PATCH, TRANSDERMAL 24 HOURS TRANSDERMAL DAILY
Status: DISCONTINUED | OUTPATIENT
Start: 2019-01-01 | End: 2019-01-01

## 2019-01-01 RX ORDER — IPRATROPIUM BROMIDE AND ALBUTEROL SULFATE 2.5; .5 MG/3ML; MG/3ML
3 SOLUTION RESPIRATORY (INHALATION)
Status: DISCONTINUED | OUTPATIENT
Start: 2019-01-01 | End: 2019-01-01

## 2019-01-01 RX ORDER — DEXTROSE MONOHYDRATE 25 G/50ML
50 INJECTION, SOLUTION INTRAVENOUS AS NEEDED
Status: DISCONTINUED | OUTPATIENT
Start: 2019-01-01 | End: 2019-01-01

## 2019-01-01 RX ORDER — CARVEDILOL 6.25 MG/1
6.25 TABLET ORAL 2 TIMES DAILY WITH MEALS
Status: DISCONTINUED | OUTPATIENT
Start: 2019-01-01 | End: 2019-01-01

## 2019-01-01 RX ORDER — IPRATROPIUM BROMIDE AND ALBUTEROL SULFATE 2.5; .5 MG/3ML; MG/3ML
SOLUTION RESPIRATORY (INHALATION)
Status: COMPLETED
Start: 2019-01-01 | End: 2019-01-01

## 2019-01-01 RX ORDER — PRAMIPEXOLE DIHYDROCHLORIDE 0.25 MG/1
0.25 TABLET ORAL NIGHTLY
Status: DISCONTINUED | OUTPATIENT
Start: 2019-01-01 | End: 2019-01-01

## 2019-01-01 RX ORDER — LORAZEPAM 2 MG/ML
1 INJECTION INTRAMUSCULAR
Status: DISCONTINUED | OUTPATIENT
Start: 2019-01-01 | End: 2019-01-01

## 2019-01-01 RX ORDER — METHYLPREDNISOLONE SODIUM SUCCINATE 125 MG/2ML
125 INJECTION, POWDER, LYOPHILIZED, FOR SOLUTION INTRAMUSCULAR; INTRAVENOUS ONCE
Status: COMPLETED | OUTPATIENT
Start: 2019-01-01 | End: 2019-01-01

## 2019-01-01 RX ORDER — SODIUM CHLORIDE 9 MG/ML
INJECTION, SOLUTION INTRAVENOUS CONTINUOUS
Status: DISCONTINUED | OUTPATIENT
Start: 2019-01-01 | End: 2019-01-01

## 2019-01-01 RX ORDER — MAGNESIUM SULFATE HEPTAHYDRATE 40 MG/ML
2 INJECTION, SOLUTION INTRAVENOUS ONCE
Status: COMPLETED | OUTPATIENT
Start: 2019-01-01 | End: 2019-01-01

## 2019-01-01 RX ORDER — MAGNESIUM OXIDE 400 MG (241.3 MG MAGNESIUM) TABLET
200 TABLET ONCE
Status: DISCONTINUED | OUTPATIENT
Start: 2019-01-01 | End: 2019-01-01

## 2019-01-01 RX ORDER — PRAMIPEXOLE DIHYDROCHLORIDE 0.25 MG/1
0.25 TABLET ORAL 2 TIMES DAILY PRN
Status: DISCONTINUED | OUTPATIENT
Start: 2019-01-01 | End: 2019-01-01

## 2019-01-01 RX ORDER — FUROSEMIDE 10 MG/ML
40 INJECTION INTRAMUSCULAR; INTRAVENOUS DAILY
Status: CANCELLED | OUTPATIENT
Start: 2019-01-01

## 2019-01-01 RX ORDER — LIDOCAINE HYDROCHLORIDE 10 MG/ML
INJECTION, SOLUTION EPIDURAL; INFILTRATION; INTRACAUDAL; PERINEURAL
Status: COMPLETED
Start: 2019-01-01 | End: 2019-01-01

## 2019-01-01 RX ORDER — MAGNESIUM OXIDE 400 MG (241.3 MG MAGNESIUM) TABLET
400 TABLET ONCE
Status: COMPLETED | OUTPATIENT
Start: 2019-01-01 | End: 2019-01-01

## 2019-01-01 RX ORDER — CETIRIZINE HYDROCHLORIDE 10 MG/1
10 TABLET ORAL EVERY EVENING
Status: DISCONTINUED | OUTPATIENT
Start: 2019-01-01 | End: 2019-01-01

## 2019-01-01 RX ORDER — ACETAMINOPHEN 325 MG/1
650 TABLET ORAL EVERY 6 HOURS PRN
Status: DISCONTINUED | OUTPATIENT
Start: 2019-01-01 | End: 2019-01-01

## 2019-01-01 RX ORDER — GABAPENTIN 400 MG/1
800 CAPSULE ORAL 4 TIMES DAILY
Status: DISCONTINUED | OUTPATIENT
Start: 2019-01-01 | End: 2019-01-01

## 2019-01-01 RX ORDER — MAGNESIUM OXIDE 400 MG (241.3 MG MAGNESIUM) TABLET
200 TABLET 2 TIMES DAILY WITH MEALS
Status: DISCONTINUED | OUTPATIENT
Start: 2019-01-01 | End: 2019-01-01

## 2019-01-01 RX ORDER — LORAZEPAM 1 MG/1
2 TABLET ORAL
Status: DISCONTINUED | OUTPATIENT
Start: 2019-01-01 | End: 2019-01-01

## 2019-01-01 RX ORDER — ASPIRIN 81 MG/1
81 TABLET ORAL DAILY
Status: DISCONTINUED | OUTPATIENT
Start: 2019-01-01 | End: 2019-01-01

## 2019-01-01 RX ORDER — NICOTINE 21 MG/24HR
1 PATCH, TRANSDERMAL 24 HOURS TRANSDERMAL DAILY
Qty: 7 PATCH | Refills: 0 | Status: ON HOLD | OUTPATIENT
Start: 2019-01-01 | End: 2019-01-01

## 2019-01-01 RX ORDER — TEMAZEPAM 15 MG/1
15 CAPSULE ORAL NIGHTLY PRN
Status: DISCONTINUED | OUTPATIENT
Start: 2019-01-01 | End: 2019-01-01

## 2019-01-01 RX ORDER — FUROSEMIDE 20 MG/1
20 TABLET ORAL DAILY
Qty: 30 TABLET | Refills: 0 | Status: SHIPPED | OUTPATIENT
Start: 2019-01-01 | End: 2019-01-01

## 2019-01-01 RX ORDER — CARVEDILOL 12.5 MG/1
12.5 TABLET ORAL 2 TIMES DAILY WITH MEALS
Status: DISCONTINUED | OUTPATIENT
Start: 2019-01-01 | End: 2019-01-01

## 2019-01-01 RX ORDER — DEXTROSE MONOHYDRATE 25 G/50ML
50 INJECTION, SOLUTION INTRAVENOUS ONCE
Status: COMPLETED | OUTPATIENT
Start: 2019-01-01 | End: 2019-01-01

## 2019-01-01 RX ORDER — PREDNISONE 20 MG/1
40 TABLET ORAL
Qty: 8 TABLET | Refills: 0 | Status: SHIPPED | OUTPATIENT
Start: 2019-01-01 | End: 2019-01-01

## 2019-01-01 RX ORDER — DULOXETIN HYDROCHLORIDE 60 MG/1
60 CAPSULE, DELAYED RELEASE ORAL DAILY
COMMUNITY
End: 2019-01-01

## 2019-01-01 RX ORDER — FUROSEMIDE 10 MG/ML
40 INJECTION INTRAMUSCULAR; INTRAVENOUS ONCE
Status: COMPLETED | OUTPATIENT
Start: 2019-01-01 | End: 2019-01-01

## 2019-01-01 RX ORDER — NICOTINE 21 MG/24HR
1 PATCH, TRANSDERMAL 24 HOURS TRANSDERMAL DAILY
Qty: 7 PATCH | Refills: 0 | Status: SHIPPED | OUTPATIENT
Start: 2019-01-01 | End: 2019-01-01

## 2019-01-01 RX ORDER — SODIUM BICARBONATE 650 MG/1
650 TABLET ORAL 2 TIMES DAILY
Qty: 30 TABLET | Refills: 1 | Status: SHIPPED | OUTPATIENT
Start: 2019-01-01

## 2019-01-01 RX ORDER — TEMAZEPAM 15 MG/1
30 CAPSULE ORAL NIGHTLY PRN
Status: DISCONTINUED | OUTPATIENT
Start: 2019-01-01 | End: 2019-01-01

## 2019-01-01 RX ORDER — DULOXETIN HYDROCHLORIDE 30 MG/1
60 CAPSULE, DELAYED RELEASE ORAL
Status: DISCONTINUED | OUTPATIENT
Start: 2019-01-01 | End: 2019-01-01

## 2019-01-01 RX ORDER — SODIUM BICARBONATE 325 MG/1
650 TABLET ORAL 2 TIMES DAILY
Status: DISCONTINUED | OUTPATIENT
Start: 2019-01-01 | End: 2019-01-01

## 2019-01-01 RX ORDER — IPRATROPIUM BROMIDE AND ALBUTEROL SULFATE 2.5; .5 MG/3ML; MG/3ML
3 SOLUTION RESPIRATORY (INHALATION) ONCE
Status: DISCONTINUED | OUTPATIENT
Start: 2019-01-01 | End: 2019-01-01

## 2019-01-01 RX ORDER — LISINOPRIL 5 MG/1
5 TABLET ORAL DAILY
Status: DISCONTINUED | OUTPATIENT
Start: 2019-01-01 | End: 2019-01-01

## 2019-01-01 RX ORDER — CARVEDILOL 6.25 MG/1
12.5 TABLET ORAL 2 TIMES DAILY WITH MEALS
Qty: 180 TABLET | Refills: 1 | Status: SHIPPED | OUTPATIENT
Start: 2019-01-01 | End: 2019-01-01 | Stop reason: ALTCHOICE

## 2019-01-01 RX ORDER — ATORVASTATIN CALCIUM 10 MG/1
10 TABLET, FILM COATED ORAL NIGHTLY
Status: DISCONTINUED | OUTPATIENT
Start: 2019-01-01 | End: 2019-01-01

## 2019-01-01 RX ORDER — LOPERAMIDE HYDROCHLORIDE 2 MG/1
2 CAPSULE ORAL 4 TIMES DAILY PRN
Status: DISCONTINUED | OUTPATIENT
Start: 2019-01-01 | End: 2019-01-01

## 2019-01-01 RX ORDER — PRAMIPEXOLE DIHYDROCHLORIDE 0.25 MG/1
0.25 TABLET ORAL ONCE
Status: COMPLETED | OUTPATIENT
Start: 2019-01-01 | End: 2019-01-01

## 2019-01-01 RX ORDER — HEPARIN SODIUM 5000 [USP'U]/ML
INJECTION, SOLUTION INTRAVENOUS; SUBCUTANEOUS
Status: COMPLETED
Start: 2019-01-01 | End: 2019-01-01

## 2019-01-01 RX ORDER — SODIUM BICARBONATE 325 MG/1
650 TABLET ORAL 3 TIMES DAILY
Status: DISCONTINUED | OUTPATIENT
Start: 2019-01-01 | End: 2019-01-01

## 2019-01-01 RX ORDER — SODIUM POLYSTYRENE SULFONATE 4.1 MEQ/G
15 POWDER, FOR SUSPENSION ORAL; RECTAL ONCE
Status: DISCONTINUED | OUTPATIENT
Start: 2019-01-01 | End: 2019-01-01

## 2019-01-01 RX ORDER — DEXTROSE MONOHYDRATE 25 G/50ML
INJECTION, SOLUTION INTRAVENOUS
Status: COMPLETED
Start: 2019-01-01 | End: 2019-01-01

## 2019-01-01 RX ORDER — PREDNISONE 10 MG/1
40 TABLET ORAL
Status: DISCONTINUED | OUTPATIENT
Start: 2019-01-01 | End: 2019-01-01

## 2019-01-01 RX ORDER — CETIRIZINE HYDROCHLORIDE 10 MG/1
10 TABLET ORAL DAILY
Status: DISCONTINUED | OUTPATIENT
Start: 2019-01-01 | End: 2019-01-01

## 2019-01-01 RX ORDER — MIDAZOLAM HYDROCHLORIDE 1 MG/ML
INJECTION INTRAMUSCULAR; INTRAVENOUS
Status: COMPLETED
Start: 2019-01-01 | End: 2019-01-01

## 2019-01-01 RX ORDER — LISINOPRIL 2.5 MG/1
2.5 TABLET ORAL DAILY
Status: DISCONTINUED | OUTPATIENT
Start: 2019-01-01 | End: 2019-01-01

## 2019-01-01 RX ORDER — TRAZODONE HYDROCHLORIDE 50 MG/1
50 TABLET ORAL NIGHTLY PRN
Status: DISCONTINUED | OUTPATIENT
Start: 2019-01-01 | End: 2019-01-01

## 2019-01-01 RX ORDER — GABAPENTIN 400 MG/1
800 CAPSULE ORAL EVERY 6 HOURS
Status: DISCONTINUED | OUTPATIENT
Start: 2019-01-01 | End: 2019-01-01

## 2019-01-01 RX ORDER — SODIUM POLYSTYRENE SULFONATE 4.1 MEQ/G
30 POWDER, FOR SUSPENSION ORAL; RECTAL ONCE
Status: COMPLETED | OUTPATIENT
Start: 2019-01-01 | End: 2019-01-01

## 2019-01-01 RX ORDER — PANTOPRAZOLE SODIUM 40 MG/1
40 TABLET, DELAYED RELEASE ORAL
Status: DISCONTINUED | OUTPATIENT
Start: 2019-01-01 | End: 2019-01-01

## 2019-01-01 RX ORDER — FUROSEMIDE 20 MG/1
20 TABLET ORAL 2 TIMES DAILY
Qty: 30 TABLET | Refills: 0 | Status: SHIPPED | COMMUNITY
Start: 2019-01-01 | End: 2019-01-01

## 2019-01-01 RX ORDER — ASPIRIN 81 MG/1
81 TABLET ORAL DAILY
Qty: 30 TABLET | Refills: 0 | Status: SHIPPED | OUTPATIENT
Start: 2019-01-01

## 2019-01-01 RX ORDER — LISINOPRIL 2.5 MG/1
2.5 TABLET ORAL ONCE
Status: COMPLETED | OUTPATIENT
Start: 2019-01-01 | End: 2019-01-01

## 2019-01-01 RX ORDER — ALBUTEROL SULFATE 2.5 MG/3ML
2.5 SOLUTION RESPIRATORY (INHALATION) EVERY 2 HOUR PRN
Status: DISCONTINUED | OUTPATIENT
Start: 2019-01-01 | End: 2019-01-01

## 2019-01-01 RX ORDER — GABAPENTIN 800 MG/1
800 TABLET ORAL 4 TIMES DAILY
COMMUNITY
End: 2019-01-01

## 2019-01-01 RX ORDER — IPRATROPIUM BROMIDE AND ALBUTEROL SULFATE 2.5; .5 MG/3ML; MG/3ML
3 SOLUTION RESPIRATORY (INHALATION) EVERY 4 HOURS PRN
Status: DISCONTINUED | OUTPATIENT
Start: 2019-01-01 | End: 2019-01-01

## 2019-01-01 RX ORDER — METOCLOPRAMIDE HYDROCHLORIDE 5 MG/ML
10 INJECTION INTRAMUSCULAR; INTRAVENOUS EVERY 8 HOURS PRN
Status: DISCONTINUED | OUTPATIENT
Start: 2019-01-01 | End: 2019-01-01

## 2019-01-01 RX ORDER — VERAPAMIL HYDROCHLORIDE 2.5 MG/ML
INJECTION, SOLUTION INTRAVENOUS
Status: COMPLETED
Start: 2019-01-01 | End: 2019-01-01

## 2019-01-01 RX ORDER — ACETAMINOPHEN 500 MG
1000 TABLET ORAL EVERY 6 HOURS PRN
Status: DISCONTINUED | OUTPATIENT
Start: 2019-01-01 | End: 2019-01-01

## 2019-01-01 RX ORDER — LORAZEPAM 2 MG/ML
2 INJECTION INTRAMUSCULAR
Status: DISCONTINUED | OUTPATIENT
Start: 2019-01-01 | End: 2019-01-01

## 2019-01-01 RX ORDER — ONDANSETRON 2 MG/ML
4 INJECTION INTRAMUSCULAR; INTRAVENOUS EVERY 6 HOURS PRN
Status: DISCONTINUED | OUTPATIENT
Start: 2019-01-01 | End: 2019-01-01

## 2019-01-01 RX ORDER — ASPIRIN 81 MG/1
TABLET, CHEWABLE ORAL
Status: COMPLETED
Start: 2019-01-01 | End: 2019-01-01

## 2019-01-01 RX ORDER — DEXTROSE AND SODIUM CHLORIDE 5; .9 G/100ML; G/100ML
INJECTION, SOLUTION INTRAVENOUS CONTINUOUS
Status: DISCONTINUED | OUTPATIENT
Start: 2019-01-01 | End: 2019-01-01

## 2019-01-01 RX ORDER — LISINOPRIL 10 MG/1
10 TABLET ORAL DAILY
Qty: 30 TABLET | Refills: 0 | Status: SHIPPED | OUTPATIENT
Start: 2019-01-01 | End: 2019-01-01

## 2019-01-01 RX ORDER — ATORVASTATIN CALCIUM 40 MG/1
40 TABLET, FILM COATED ORAL NIGHTLY
Status: DISCONTINUED | OUTPATIENT
Start: 2019-01-01 | End: 2019-01-01

## 2019-01-01 RX ORDER — LORAZEPAM 1 MG/1
1 TABLET ORAL
Status: DISCONTINUED | OUTPATIENT
Start: 2019-01-01 | End: 2019-01-01

## 2019-01-01 RX ORDER — MELATONIN
325
Status: DISCONTINUED | OUTPATIENT
Start: 2019-01-01 | End: 2019-01-01

## 2019-01-01 RX ORDER — FUROSEMIDE 20 MG/1
20 TABLET ORAL DAILY
Status: DISCONTINUED | OUTPATIENT
Start: 2019-01-01 | End: 2019-01-01

## 2019-01-01 RX ORDER — LISINOPRIL 10 MG/1
10 TABLET ORAL DAILY
Status: DISCONTINUED | OUTPATIENT
Start: 2019-01-01 | End: 2019-01-01

## 2019-01-01 RX ORDER — PREDNISONE 20 MG/1
40 TABLET ORAL
Status: DISCONTINUED | OUTPATIENT
Start: 2019-01-01 | End: 2019-01-01

## 2019-01-01 RX ORDER — CODEINE SULFATE 30 MG/1
30 TABLET ORAL ONCE
Status: COMPLETED | OUTPATIENT
Start: 2019-01-01 | End: 2019-01-01

## 2019-01-01 RX ORDER — CARVEDILOL 6.25 MG/1
6.25 TABLET ORAL ONCE
Status: COMPLETED | OUTPATIENT
Start: 2019-01-01 | End: 2019-01-01

## 2019-01-01 RX ORDER — FLUTICASONE PROPIONATE 50 MCG
2 SPRAY, SUSPENSION (ML) NASAL DAILY
Status: DISCONTINUED | OUTPATIENT
Start: 2019-01-01 | End: 2019-01-01

## 2019-01-01 RX ADMIN — PRAMIPEXOLE DIHYDROCHLORIDE 0.25 MG: 0.25 TABLET ORAL at 20:38:00

## 2019-01-01 RX ADMIN — PANTOPRAZOLE SODIUM 40 MG: 40 TABLET, DELAYED RELEASE ORAL at 17:01:00

## 2019-01-01 RX ADMIN — GABAPENTIN 800 MG: 400 CAPSULE ORAL at 22:03:00

## 2019-01-01 RX ADMIN — CARVEDILOL 6.25 MG: 6.25 TABLET ORAL at 09:28:00

## 2019-01-01 RX ADMIN — TEMAZEPAM 15 MG: 15 CAPSULE ORAL at 01:07:00

## 2019-01-01 RX ADMIN — SODIUM BICARBONATE 650 MG: 325 TABLET ORAL at 18:30:00

## 2019-01-01 RX ADMIN — DEXTROSE MONOHYDRATE: 25 INJECTION, SOLUTION INTRAVENOUS at 23:30:00

## 2019-01-01 RX ADMIN — GABAPENTIN 800 MG: 400 CAPSULE ORAL at 09:51:00

## 2019-01-01 RX ADMIN — SODIUM POLYSTYRENE SULFONATE 30 G: 4.1 POWDER, FOR SUSPENSION ORAL; RECTAL at 16:29:00

## 2019-01-01 RX ADMIN — DEXTROSE MONOHYDRATE 50 ML: 25 INJECTION, SOLUTION INTRAVENOUS at 17:01:00

## 2019-01-01 RX ADMIN — GABAPENTIN 800 MG: 400 CAPSULE ORAL at 05:31:00

## 2019-01-01 RX ADMIN — GABAPENTIN 800 MG: 400 CAPSULE ORAL at 12:06:00

## 2019-01-01 RX ADMIN — PANTOPRAZOLE SODIUM 40 MG: 40 TABLET, DELAYED RELEASE ORAL at 06:12:00

## 2019-01-01 RX ADMIN — CARVEDILOL 12.5 MG: 12.5 TABLET ORAL at 08:14:00

## 2019-01-01 RX ADMIN — CARVEDILOL 6.25 MG: 6.25 TABLET ORAL at 16:22:00

## 2019-01-01 RX ADMIN — FLUTICASONE PROPIONATE 2 SPRAY: 50 MCG SPRAY, SUSPENSION (ML) NASAL at 09:27:00

## 2019-01-01 RX ADMIN — GABAPENTIN 800 MG: 400 CAPSULE ORAL at 14:10:00

## 2019-01-01 RX ADMIN — GABAPENTIN 800 MG: 400 CAPSULE ORAL at 09:27:00

## 2019-01-01 RX ADMIN — DULOXETIN HYDROCHLORIDE 60 MG: 30 CAPSULE, DELAYED RELEASE ORAL at 10:56:00

## 2019-01-01 RX ADMIN — PANTOPRAZOLE SODIUM 40 MG: 40 TABLET, DELAYED RELEASE ORAL at 21:08:00

## 2019-01-01 RX ADMIN — Medication 2.5 MG: at 09:16:00

## 2019-01-01 RX ADMIN — DULOXETIN HYDROCHLORIDE 60 MG: 30 CAPSULE, DELAYED RELEASE ORAL at 09:27:00

## 2019-01-01 RX ADMIN — DEXTROSE MONOHYDRATE 50 ML: 25 INJECTION, SOLUTION INTRAVENOUS at 06:11:00

## 2019-01-01 RX ADMIN — DEXTROSE AND SODIUM CHLORIDE: 5; .9 INJECTION, SOLUTION INTRAVENOUS at 01:39:00

## 2019-01-01 RX ADMIN — GABAPENTIN 800 MG: 400 CAPSULE ORAL at 22:37:00

## 2019-01-01 RX ADMIN — CETIRIZINE HYDROCHLORIDE 10 MG: 10 TABLET ORAL at 09:27:00

## 2019-01-01 RX ADMIN — SODIUM BICARBONATE 650 MG: 325 TABLET ORAL at 21:07:00

## 2019-01-01 RX ADMIN — ACETAMINOPHEN 650 MG: 325 TABLET ORAL at 17:01:00

## 2019-01-01 RX ADMIN — DULOXETIN HYDROCHLORIDE 60 MG: 30 CAPSULE, DELAYED RELEASE ORAL at 08:15:00

## 2019-01-01 RX ADMIN — PANTOPRAZOLE SODIUM 40 MG: 40 TABLET, DELAYED RELEASE ORAL at 18:47:00

## 2019-01-01 RX ADMIN — MAGNESIUM OXIDE 400 MG (241.3 MG MAGNESIUM) TABLET 400 MG: TABLET at 18:04:00

## 2019-01-01 RX ADMIN — ACETAMINOPHEN 650 MG: 325 TABLET ORAL at 16:22:00

## 2019-01-01 RX ADMIN — GABAPENTIN 800 MG: 400 CAPSULE ORAL at 21:07:00

## 2019-01-01 RX ADMIN — ATORVASTATIN CALCIUM 40 MG: 40 TABLET, FILM COATED ORAL at 21:09:00

## 2019-01-01 RX ADMIN — PRAMIPEXOLE DIHYDROCHLORIDE 0.25 MG: 0.25 TABLET ORAL at 06:12:00

## 2019-01-01 RX ADMIN — ATORVASTATIN CALCIUM 10 MG: 10 TABLET, FILM COATED ORAL at 22:03:00

## 2019-01-01 RX ADMIN — ASPIRIN 81 MG: 81 TABLET ORAL at 08:15:00

## 2019-01-01 RX ADMIN — PANTOPRAZOLE SODIUM 40 MG: 40 TABLET, DELAYED RELEASE ORAL at 18:03:00

## 2019-01-01 RX ADMIN — CARVEDILOL 6.25 MG: 6.25 TABLET ORAL at 18:46:00

## 2019-01-01 RX ADMIN — HEPARIN SODIUM 5000 UNITS: 5000 INJECTION, SOLUTION INTRAVENOUS; SUBCUTANEOUS at 14:11:00

## 2019-01-01 RX ADMIN — PANTOPRAZOLE SODIUM 40 MG: 40 TABLET, DELAYED RELEASE ORAL at 05:04:00

## 2019-01-01 RX ADMIN — PRAMIPEXOLE DIHYDROCHLORIDE 0.25 MG: 0.25 TABLET ORAL at 02:08:00

## 2019-01-01 RX ADMIN — TRAZODONE HYDROCHLORIDE 50 MG: 50 TABLET ORAL at 01:57:00

## 2019-01-01 RX ADMIN — PRAMIPEXOLE DIHYDROCHLORIDE 0.25 MG: 0.25 TABLET ORAL at 21:05:00

## 2019-01-01 RX ADMIN — ACETAMINOPHEN 650 MG: 325 TABLET ORAL at 21:20:00

## 2019-01-01 RX ADMIN — PANTOPRAZOLE SODIUM 40 MG: 40 TABLET, DELAYED RELEASE ORAL at 16:22:00

## 2019-01-01 RX ADMIN — MELATONIN 325 MG: at 10:56:00

## 2019-01-01 RX ADMIN — SODIUM CHLORIDE: 9 INJECTION, SOLUTION INTRAVENOUS at 17:05:00

## 2019-01-01 RX ADMIN — CARVEDILOL 12.5 MG: 12.5 TABLET ORAL at 17:01:00

## 2019-01-01 RX ADMIN — CETIRIZINE HYDROCHLORIDE 10 MG: 10 TABLET ORAL at 09:51:00

## 2019-01-01 RX ADMIN — SODIUM CHLORIDE: 9 INJECTION, SOLUTION INTRAVENOUS at 15:30:00

## 2019-01-01 RX ADMIN — ASPIRIN 81 MG: 81 TABLET ORAL at 09:38:00

## 2019-01-01 RX ADMIN — CARVEDILOL 6.25 MG: 6.25 TABLET ORAL at 09:50:00

## 2019-01-01 RX ADMIN — ASPIRIN 81 MG: 81 TABLET ORAL at 09:51:00

## 2019-01-01 RX ADMIN — TEMAZEPAM 15 MG: 15 CAPSULE ORAL at 02:08:00

## 2019-01-01 RX ADMIN — GABAPENTIN 800 MG: 400 CAPSULE ORAL at 16:22:00

## 2019-01-01 RX ADMIN — CETIRIZINE HYDROCHLORIDE 10 MG: 10 TABLET ORAL at 21:08:00

## 2019-01-01 RX ADMIN — PREDNISONE 40 MG: 20 TABLET ORAL at 09:27:00

## 2019-01-01 RX ADMIN — GABAPENTIN 800 MG: 400 CAPSULE ORAL at 17:01:00

## 2019-01-01 RX ADMIN — MAGNESIUM SULFATE HEPTAHYDRATE 2 G: 40 INJECTION, SOLUTION INTRAVENOUS at 12:54:00

## 2019-01-01 RX ADMIN — CARVEDILOL 6.25 MG: 6.25 TABLET ORAL at 10:55:00

## 2019-01-01 RX ADMIN — ATORVASTATIN CALCIUM 10 MG: 10 TABLET, FILM COATED ORAL at 20:38:00

## 2019-01-01 RX ADMIN — SODIUM CHLORIDE: 9 INJECTION, SOLUTION INTRAVENOUS at 03:00:00

## 2019-01-01 RX ADMIN — NICOTINE 1 PATCH: 21 MG/24HR PATCH, TRANSDERMAL 24 HOURS TRANSDERMAL at 10:56:00

## 2019-01-01 RX ADMIN — DEXTROSE MONOHYDRATE 50 ML: 25 INJECTION, SOLUTION INTRAVENOUS at 22:22:00

## 2019-01-01 RX ADMIN — PANTOPRAZOLE SODIUM 40 MG: 40 TABLET, DELAYED RELEASE ORAL at 05:31:00

## 2019-01-01 RX ADMIN — NICOTINE 1 PATCH: 21 MG/24HR PATCH, TRANSDERMAL 24 HOURS TRANSDERMAL at 08:15:00

## 2019-01-01 RX ADMIN — CARVEDILOL 12.5 MG: 12.5 TABLET ORAL at 18:03:00

## 2019-01-01 RX ADMIN — CARVEDILOL 6.25 MG: 6.25 TABLET ORAL at 18:31:00

## 2019-01-01 RX ADMIN — GABAPENTIN 800 MG: 400 CAPSULE ORAL at 18:46:00

## 2019-01-01 RX ADMIN — GABAPENTIN 800 MG: 400 CAPSULE ORAL at 21:05:00

## 2019-01-01 RX ADMIN — CODEINE SULFATE 30 MG: 30 TABLET ORAL at 18:16:00

## 2019-01-01 RX ADMIN — DULOXETIN HYDROCHLORIDE 60 MG: 30 CAPSULE, DELAYED RELEASE ORAL at 09:50:00

## 2019-01-01 RX ADMIN — NICOTINE 1 PATCH: 21 MG/24HR PATCH, TRANSDERMAL 24 HOURS TRANSDERMAL at 09:50:00

## 2019-01-01 RX ADMIN — ATORVASTATIN CALCIUM 10 MG: 10 TABLET, FILM COATED ORAL at 21:04:00

## 2019-01-01 RX ADMIN — ASPIRIN 325 MG: 81 TABLET, CHEWABLE ORAL at 14:30:00

## 2019-01-01 RX ADMIN — HEPARIN SODIUM 5000 UNITS: 5000 INJECTION, SOLUTION INTRAVENOUS; SUBCUTANEOUS at 21:07:00

## 2019-01-01 RX ADMIN — GABAPENTIN 800 MG: 400 CAPSULE ORAL at 10:56:00

## 2019-01-01 RX ADMIN — ACETAMINOPHEN 650 MG: 325 TABLET ORAL at 09:50:00

## 2019-01-01 RX ADMIN — LOPERAMIDE HYDROCHLORIDE 2 MG: 2 CAPSULE ORAL at 23:25:00

## 2019-01-01 RX ADMIN — GABAPENTIN 800 MG: 400 CAPSULE ORAL at 05:04:00

## 2019-01-01 RX ADMIN — PRAMIPEXOLE DIHYDROCHLORIDE 0.25 MG: 0.25 TABLET ORAL at 01:02:00

## 2019-01-01 RX ADMIN — NICOTINE 1 PATCH: 21 MG/24HR PATCH, TRANSDERMAL 24 HOURS TRANSDERMAL at 09:28:00

## 2019-01-01 RX ADMIN — FUROSEMIDE 40 MG: 10 INJECTION INTRAMUSCULAR; INTRAVENOUS at 18:31:00

## 2019-01-01 RX ADMIN — GABAPENTIN 800 MG: 400 CAPSULE ORAL at 18:04:00

## 2019-01-01 RX ADMIN — PANTOPRAZOLE SODIUM 40 MG: 40 TABLET, DELAYED RELEASE ORAL at 09:31:00

## 2019-01-01 RX ADMIN — DEXTROSE MONOHYDRATE 50 ML: 25 INJECTION, SOLUTION INTRAVENOUS at 01:39:00

## 2019-01-01 RX ADMIN — PRAMIPEXOLE DIHYDROCHLORIDE 0.25 MG: 0.25 TABLET ORAL at 14:11:00

## 2019-01-01 RX ADMIN — CETIRIZINE HYDROCHLORIDE 10 MG: 10 TABLET ORAL at 18:46:00

## 2019-01-01 RX ADMIN — CETIRIZINE HYDROCHLORIDE 10 MG: 10 TABLET ORAL at 08:15:00

## 2019-01-01 RX ADMIN — CARVEDILOL 6.25 MG: 6.25 TABLET ORAL at 10:07:00

## 2019-01-01 RX ADMIN — GABAPENTIN 800 MG: 400 CAPSULE ORAL at 12:53:00

## 2019-03-20 PROBLEM — R19.8 PERFORATED VISCUS: Status: RESOLVED | Noted: 2018-05-08 | Resolved: 2019-01-01

## 2019-03-20 PROBLEM — D72.829 LEUKOCYTOSIS: Status: RESOLVED | Noted: 2018-05-08 | Resolved: 2019-01-01

## 2019-03-20 PROBLEM — D75.839 THROMBOCYTOSIS: Status: RESOLVED | Noted: 2018-05-25 | Resolved: 2019-01-01

## 2019-03-20 PROBLEM — I70.8 AORTO-ILIAC ATHEROSCLEROSIS (HCC): Status: RESOLVED | Noted: 2017-03-31 | Resolved: 2019-01-01

## 2019-03-20 PROBLEM — R09.89 BILATERAL CAROTID BRUITS: Status: RESOLVED | Noted: 2018-03-02 | Resolved: 2019-01-01

## 2019-03-20 PROBLEM — N17.9 ACUTE KIDNEY INJURY (HCC): Status: RESOLVED | Noted: 2018-05-08 | Resolved: 2019-01-01

## 2019-03-20 PROBLEM — E87.5 HYPERKALEMIA: Status: RESOLVED | Noted: 2018-11-20 | Resolved: 2019-01-01

## 2019-03-20 PROBLEM — D64.9 ANEMIA, UNSPECIFIED TYPE: Status: RESOLVED | Noted: 2018-05-25 | Resolved: 2019-01-01

## 2019-03-20 PROBLEM — M54.12 CERVICAL RADICULITIS: Status: RESOLVED | Noted: 2018-03-07 | Resolved: 2019-01-01

## 2019-03-20 PROBLEM — I70.0 AORTO-ILIAC ATHEROSCLEROSIS (HCC): Status: RESOLVED | Noted: 2017-03-31 | Resolved: 2019-01-01

## 2019-03-20 PROBLEM — I26.99 OTHER PULMONARY EMBOLISM WITHOUT ACUTE COR PULMONALE, UNSPECIFIED CHRONICITY (HCC): Status: RESOLVED | Noted: 2018-02-14 | Resolved: 2019-01-01

## 2019-03-20 PROBLEM — R73.9 HYPERGLYCEMIA: Status: RESOLVED | Noted: 2018-05-08 | Resolved: 2019-01-01

## 2019-03-20 PROBLEM — I70.0 ABDOMINAL AORTIC ATHEROSCLEROSIS (HCC): Status: ACTIVE | Noted: 2017-03-31

## 2019-04-29 PROBLEM — J44.1 COPD WITH ACUTE EXACERBATION (HCC): Status: ACTIVE | Noted: 2019-01-01

## 2019-04-29 PROBLEM — I50.9 ACUTE ON CHRONIC CONGESTIVE HEART FAILURE, UNSPECIFIED HEART FAILURE TYPE (HCC): Status: ACTIVE | Noted: 2019-01-01

## 2019-04-29 PROBLEM — I50.9 ACUTE ON CHRONIC CONGESTIVE HEART FAILURE (HCC): Status: ACTIVE | Noted: 2019-01-01

## 2019-04-29 NOTE — PROGRESS NOTES
04/29/19 1732 04/29/19 1744 04/29/19 1747   Vital Signs   BP (!) 172/93 (!) 164/83 (!) 165/82   BP Location Left arm Left arm Left arm   BP Method Automatic Automatic Automatic   Patient Position Lying Sitting Standing

## 2019-04-29 NOTE — ED PROVIDER NOTES
Patient Seen in: [facility] Emergency Department    History   Patient presents with:  Fatigue (constitutional, neurologic)    Stated Complaint: insomnia    HPI    42-year-old male with history of CHF, COPD, resents emergency room with chief complain PAIN MANAGEMENT   • CERVICAL FACET INJECTION Bilateral 5/28/2013    Performed by Khushboo Shepherd MD at [address] [facility] N/A 9/30/2013    Performed by Khushboo Shepherd MD at [address] abuse--none since 1992    Drug use: No      Review of Systems    Positive for stated complaint: insomnia  Other systems are as noted in HPI. Constitutional and vital signs reviewed. All other systems reviewed and negative except as noted above.     Ph components:    PTT 35.7 (*)     All other components within normal limits   CBC W/ DIFFERENTIAL - Abnormal; Notable for the following components:    HGB 12.4 (*)     RDW 15.7 (*)     RDW-SD 52.5 (*)     All other components within normal limits   TROPONIN Date Reviewed: 3/20/2019          ICD-10-CM Noted POA    Acute on chronic congestive heart failure ([facility] [address] I50.9 4/29/2019 Unknown

## 2019-04-29 NOTE — CONSULTS
[facility] Cardiology Consultation    Tam[address] 63  Patient Status:  Emergency    12/10/1950 MRN [id number]   Location [address] [facility] Attending Iam Serrano, [facility] Day # 0 PCP Nik De La O MD     Reason for Consultation:  CHF Damien Soni MD at [facility]   • CERVICAL FACET INJECTION Bilateral 5/28/2013    Performed by Damien Soni MD at [address] N/A 9/30/2013    Performed by Damien Soni MD at [facility] He has never used smokeless tobacco. He reports that he does not drink alcohol or use drugs. Review of Systems:  All systems were reviewed and are negative except as described above in HPI.     Physical Exam:      Temp:  [97.8 °F (36.6 °C)] 97.8 °F (36.6 Mod-sev MR on past Echo  10. Squamous cell lung CA s/p wedge resection 6/15.  11.  Non-compliance        Plan:  Admit. Rx COPD and heart failure. Med rec complete.     Isidro Mccarty  4/29/2019  4:02 PM

## 2019-04-29 NOTE — ED INITIAL ASSESSMENT (HPI)
Pt has multiple c/o, insomnia since Friday, swollen ankles/feet bilat, states he sees pain management

## 2019-04-29 NOTE — H&P
[facility] Hospitalist H&P       CC: Patient presents with:  Fatigue (constitutional, neurologic)       PCP: Miguel Pineda MD    History of Present Illness:  Pt is a 76year old male with PMHx CHF, COPD, hx lung CA s/p resection, CAD s/p PCI, remote hx EtOH a [place] [facility]   • CERVICAL FACET INJECTION Bilateral 5/28/2013    Performed by Doreen Jones MD at [address] [facility] N/A 9/30/2013    Performed by Doreen Jones MD at [address] [facility] Dihydrochloride (MIRAPEX) 0.25 MG Oral Tab Take 1 tablet (0.25 mg total) by mouth nightly. Disp: 90 tablet Rfl: 3   lisinopril 2.5 MG Oral Tab Take 1 tablet (2.5 mg total) by mouth daily.  Disp: 90 tablet Rfl: 1   carvedilol 6.25 MG Oral Tab Take 1 tablet ( (61.2 kg)    Height: 167.6 cm (5' 6\")        Wt Readings from Last 6 Encounters:  04/29/19 : 135 lb (61.2 kg)  03/20/19 : 150 lb 6.4 oz (68.2 kg)  02/06/19 : 145 lb (65.8 kg)  12/31/18 : 145 lb (65.8 kg)  12/17/18 : 138 lb (62.6 kg)  12/03/18 : 139 lb (63 pulmonary opacity. CONCLUSION:  Mild CHF or fluid overload.   Stable interstitial fibrosis/atelectasis    Dictated by: Varun John MD on 4/29/2019 at 15:02     Approved by: Varun John MD                   ASSESSMENT / PLAN:     76year old male wi

## 2019-04-30 NOTE — PROGRESS NOTES
[facility] Hospitalist Progress Note     Adriana Ballard.  Patient Status:  Inpatient    12/10/1950 MRN [id number]   [facility] 8NE-A Attending Yahaira Dawkins MD   Hosp Day # 1 PCP Kenyatta Michael MD     CC: follow up    SUBJECTIVE:  Stable ov breakfast   • ipratropium-albuterol  3 mL Nebulization Once   • carvedilol  6.25 mg Oral BID with meals   • lisinopril  2.5 mg Oral Daily   • atorvastatin  40 mg Oral Nightly   • DULoxetine HCl  60 mg Oral Daily   • gabapentin  800 mg Oral QID   • Pantopra

## 2019-04-30 NOTE — PROGRESS NOTES
[address] Cardiology  Progress Note    Criselda Hogan.  Patient Status:  Inpatient    12/10/1950 MRN [id number]   [facility] 8NE-A Attending Carlie James MD   Hosp Day # 1 PCP Nik De La O MD     Subjective:   No chest pa icterus. Neck:  There is no jugular venous distention. Cardiovascular:  Cardiovascular examination demonstrates a regular rate and rhythm. There is normal S1, S2. There is no S3 or S4. There are no murmurs, rubs, or gallops. No click is appreciated. 12/14.     -Non-compliant with follow up testing  3. Acute on chronic systolic CHF. 4. Recurrent UGI bleed / past perforated .    5. H/O ASA toxicity / abuse  6. H/O EtOH abuse - remote  7. Pulmonary HTN - improving with diuresis.   Mod-sev MR on past E

## 2019-04-30 NOTE — PROGRESS NOTES
Patient alert and oriented can be forgetful. SpO2 91% on room air. NSR on tele. Patient is deaf in left ear. Diminished lung sounds. Reports of 7/10 foot pain relieved by gabapentin. Pt. Is up with standby assistance, and continent of bowel and bladder.

## 2019-04-30 NOTE — PLAN OF CARE
Problem: Patient/Family Goals  Goal: Patient/Family Long Term Goal  Description  Patient's Long Term Goal: \"get restless legs under control so I can sleep better. \"    Interventions:  - Follow up appointments with physicians, take prescribed medications

## 2019-04-30 NOTE — PLAN OF CARE
Received patient from ED around 1730. Pt a/o x 4, periods of forgetfulness, RA, SR on tele monitor. Denies chest pain/SOB at present. +2 pitting edema noted to BLE, pale skin, warm/dry. Pt advised he did not take any of his medications today.  Evening meds

## 2019-04-30 NOTE — PHYSICAL THERAPY NOTE
PHYSICAL THERAPY QUICK EVALUATION - INPATIENT    Room Number: 8620/8620-A  Evaluation Date: 4/30/2019  Presenting Problem: Acute CHF  Physician Order: PT Eval and Treat    Pt is 76year old male admitted on 4/29/2019 to emergency room with c/o CESIA mckeon unspecified hyperlipidemia    • Vertigo     11/2014   • Visual impairment     Rt eye severe impairment       Past Surgical History  Past Surgical History:   Procedure Laterality Date   • BRONCHOSCOPY N/A 4/8/2015    Performed by Zahraa Madrid MD at [address] [place] [address] EPIDURAL LEAD Bilateral 11/11/2015    Performed by Jeffy Fraire MD at [address] [facility]   • THORACOTOMY Right 6/19/2015    Performed by Josie Macdonald, Cammie Marie MD at [address] [facility]  Type of Home: Apartment Ambulation on Room Air: 98            AM-PAC '6-Clicks' [address]  How much difficulty does the patient currently have. ..  -   Turning over in bed (including adjusting bedclothes, sheets and blankets)?: None   -   Sitting down o 0  7. Standing on one leg                                                                0    Pt scored 22/28 which is a slight risk for falls.  Patient reports that his balance worsens during periods of neuropathy exacerba independently

## 2019-05-01 NOTE — PROGRESS NOTES
Pulmonary Progress Note        NAME: Lynette Peña St: 9444/9199-C - MRN: [id number] - Age: 76year old - : 12/10/1950        Last 24hrs: No events overnight, breathing is ok, at the moment    OBJECTIVE:   19  2345 19  0515 19  0 109 103   CO2 23.0  23.0 24.0 27.0   BUN 15  15 24* 42*   CA 8.8  8.8 9.1 8.4*   MG  --  2.0  --        Recent Labs     04/29/19  1423 04/30/19  0459   ALT 17 15*   AST 23 19   ALB 3.5 3.3*       Invalid input(s): ARTERIALPH, ARTERIALPO2, ARTERIALPCO2, ART interest in quitting  4. H/o lung CA s/p resection- stage 1A- no evidence of recurrence on surveillance scans  5. proph- LMWH  6.  Dispo- Full code  - will follow  -hopefully home tomorrow  [facility] Pulmonary and Critical Care

## 2019-05-01 NOTE — PROGRESS NOTES
[facility] Cardiology  Progress Note    Gutierrez Hewitt.  Patient Status:  Inpatient    12/10/1950 MRN [id number]   [facility] 8NE-A Attending Adrianna Ba MD   [facility] Day # 2 PCP Tika Simeon MD     Subjective:   No chest pa Atraumatic. No icterus. Neck:  There is no jugular venous distention. Cardiovascular:  Cardiovascular examination demonstrates a regular rate and rhythm. There is normal S1, S2. There is no S3 or S4. There are no murmurs, rubs, or gallops.   No click AWMI                 -Echo 7/14 with grossly NL EF - Echo 8/14 with EF 45% and AS WMA, c/w ECG findings.                -Cath 12/14 with high grade Dz LCx s/p bare metal stent p LCx 12/14.     -Non-compliant with follow up testing  3.  Acute on chronic syst

## 2019-05-01 NOTE — PLAN OF CARE
ALERT AND ORIENTED X4 ON TELE MONITOR HR 70'S SINUS RHYTHM. DENIES ANY PAIN AT THIS TIME. UPDATED W/ POC AND VERBALIZED UNDERSTANDING. ALL NEEDS ATTENDED AND WILL CONTINUE T O MONITOR. CALL LIGHT WITHIN REACH.

## 2019-05-01 NOTE — PROGRESS NOTES
Patient alert an oriented. NSR on tele, 96% on room air. Pt. Is up at davida. SCDs on. Patient denies any pain or SOB. Expiratory wheezing upon auscultation bilateral breath sounds. Call light is within reach, will continue to monitor.

## 2019-05-01 NOTE — PROGRESS NOTES
[facility] Hospitalist Progress Note     Rey Bunch.  Patient Status:  Inpatient    12/10/1950 MRN [id number]   [facility] 8NE-A Attending Rosanna Sampson MD   Hosp Day # 2 PCP Bianca Vyas MD     CC: follow up    SUBJECTIVE:  Aubree Rogers mg Oral QID   • Pantoprazole Sodium  40 mg Oral BID AC   • Pramipexole Dihydrochloride  0.25 mg Oral Nightly   • ipratropium-albuterol  3 mL Nebulization 6 times per day   • enoxaparin  40 mg Subcutaneous QPM     Continuous Infusing Medication:  PRN Medica

## 2019-05-01 NOTE — CONSULTS
Heart Failure Navigator Progress Note    Patient was evaluated by the Heart Failure Navigator for understanding, verbalization, demonstration, and recall of education related to heart failure, overall adherence to the behaviors neces appointment       _x__ yes  ___ no        Patient has adequate transportation to attend follow-up appointments    __x_ yes  ___ no - he still drives on and off.  He has  that he calls for his appointments and some days, his daughter will be able to pr

## 2019-05-01 NOTE — OCCUPATIONAL THERAPY NOTE
OCCUPATIONAL THERAPY QUICK EVALUATION - INPATIENT    Room Number: 7214/2357-J  Evaluation Date: 5/1/2019     Type of Evaluation: Initial and Quick Eval  Presenting Problem: heart failure and COPD exacerbation    Physician Order: IP Consult to Occupational 4/8/2015    Performed by Digna Amado MD at [address]   • CATH BARE [facility]     • CATH DRUG ELUTING STENT  2014   • CATH PERCUTANEOUS  TRANSLUMINAL CORONARY ANGIOPLASTY      most recent Jan/2015   • CERVICAL FACET INJECTION Bilateral 6/14/201 OCCUPATIONAL PROFILE    HOME SITUATION  Type of Home: Apartment  Home Layout: One level  Lives With: Alone    Toilet and Equipment: Standard height toilet  Shower/Tub and Equipment: Tub-shower combo; Shower chair       Occupation/Status: retired  Hand SATURATIONS                ACTIVITIES OF DAILY LIVING ASSESSMENT  AM-PAC ‘6-Clicks’ Inpatient Daily Activity Short Form   How much help from another person does the patient currently need…  -   Putting on and taking off regular lower body clothing?: None is functioning at a moderate impairment level, with deficits noted in the areas of shopping, food preparation, housekeeping, and laundry. This indicates pt would benefit from increased assistance for IADL performance.      Recommend pt work with Rafael Flynn OT to ma

## 2019-05-02 NOTE — PROGRESS NOTES
Pulmonary Progress Note        NAME: Lynette Peña St: 5932/4948-C - MRN: [id number] - Age: 76year old - : 12/10/1950        Last 24hrs: fell out of a chair earlier today, breathing is stable    OBJECTIVE:   19  0400 19  0411 19 CO2 23.0  23.0 24.0 27.0   BUN 15  15 24* 42*   CA 8.8  8.8 9.1 8.4*   MG  --  2.0  --        Recent Labs     04/29/19  1423 04/30/19  0459   ALT 17 15*   AST 23 19   ALB 3.5 3.3*       Invalid input(s): ARTERIALPH, ARTERIALPO2, ARTERIALPCO2, ARTERIALHCO quitting  4. H/o lung CA s/p resection- stage 1A- no evidence of recurrence on surveillance scans  5. proph- LMWH  6.  Dispo- Full code  - will follow  -hopefully home today  -f/u w/ Dr. Lona Deleon in 1-2 weeks or MADHAVI Hubbard [facility] Pulmonary and Critica

## 2019-05-02 NOTE — PLAN OF CARE
Alert & orientedx4. NSR on tele. Pt denies chest pain & SOB. Oxygen saturations 97% on Ra. Continent of bowel & bladder. Up w/ SBA. POC: chris Santana & monitor RF    1150:  Betzy EAST walked into pts room to answer call light & found pt to be laying on including cough, deep breathe, Incentive Spirometry  - Assess the need for suctioning and perform as needed  - Assess and instruct to report SOB or any respiratory difficulty  - Respiratory Therapy support as indicated  - Manage/alleviate anxiety  - Monito

## 2019-05-02 NOTE — PLAN OF CARE
This RN was called into bathroom by PCT, pt found to be on bathroom floor. Water noted all over floor. Per pt he slipped while trying to put diaper on, fell and hit L thigh and elbow. Skin intact, no redness or bruising noted.  Assisted pt back to feet and

## 2019-05-02 NOTE — PLAN OF CARE
Received call back from Dr. Cecilia Cota, pt to stay this evening for observation after fall. Dr. Cecilia Cota spoke to patient over the phone regarding observation overnight and patient now amenable. No need to restart IV per Dr. Cecilia Cota.  Home medications secured in pyxi

## 2019-05-02 NOTE — PLAN OF CARE
ALERT AND ORIENTED X4 ON TELE MONITOR HR 80'S SINUS RHYTHM. SCD'S BOTH LEGS. DENIES ANY PAIN. UPDATED W/ POC AND VERBALIZED UNDERSTANDING. ALL NEEDS ATTENDED AND WILL CONTINUE TO MONITOR. CALL LIGHT WITHIN REACH.

## 2019-05-02 NOTE — CM/SW NOTE
Informed by nurse that pt is receptive to [facility]--requested liaison to meet with pt    Pt to discharge today--has requested Ammy Nance (aware of cost).   Ammy Nance to pick pt up @ 1800 (PCS form completed)

## 2019-05-02 NOTE — PROGRESS NOTES
[facility] Cardiology  Progress Note    Leonard Hunter.  Patient Status:  Inpatient    12/10/1950 MRN [id number]   [facility] 8NE-A Attending Fe Avelar MD   Hosp Day # 3 PCP Rip Rueda, MD     Subjective:   No chest pa examination demonstrates a regular rate and rhythm. There is normal S1, S2. There is no S3 or S4. There are no murmurs, rubs, or gallops. No click is appreciated. PMI is nondisplaced with a normal apical impulse.     Pulmonary:  Lungs are coarse on aus stent Hx                 -ECG suggests past AWMI                 -Echo 7/14 with grossly NL EF - Echo 8/14 with EF 45% and AS WMA, c/w ECG findings.                -Cath 12/14 with high grade Dz LCx s/p bare metal stent p LCx 12/14.     -Non-compliant with

## 2019-05-03 NOTE — DISCHARGE SUMMARY
[facility] Discharge Summary    Patient ID:  Cole Garza  [id number]  76year old  12/10/1950    Admit date: 4/29/2019  Discharge date and time: 5/3/19  Attending Physician: Devin Cummings MD  Primary Care Physician: Pat Choe MD was seen by cardiology and pulmonary. Received diuresis and steroids. Transitioned to oral meds on d/c with sig improvement in sx. Echo appeared comparable to previous and with an improvement in EF. On day of discharge, pt had 2 falls.  One was while si as:  NEURONTIN     Meclizine HCl 25 MG Tabs  Commonly known as:  ANTIVERT  Take 1 tablet (25 mg total) by mouth 3 (three) times daily as needed for Dizziness.      NASACORT ALLERGY 24HR 55 MCG/ACT Aero  Generic drug:  Triamcinolone Acetonide     Pantoprazol 15: 02     Approved by: Marguerite Yu MD            Operative Procedures:    Activity: activity as tolerated  Diet: cardiac diet  Wound Care: none needed  Code Status: Full Code  O2: none  Total Time Coordinating Care: Greater than 30 minutes Patient had opp

## 2019-05-03 NOTE — PROGRESS NOTES
[address] Cardiology  Progress Note    Noris Cordon.  Patient Status:  Inpatient    12/10/1950 MRN [id number]   [facility] 8NE-A Attending Joanne Payne MD   [address] Day # 4 PCP Elizabeth Lazo MD     Subjective:   No chest pa demonstrates a regular rate and rhythm. There is normal S1, S2. There is no S3 or S4. There are no murmurs, rubs, or gallops. No click is appreciated. PMI is nondisplaced with a normal apical impulse.     Pulmonary:  Lungs are coarse on auscultation bi SR    Assessment:    1. Paroxysmal atrial fibrillation    -Spontaneously reverted to SR .     -Pt is not an appropriate anticoagulation candidate.     2. CAD                 -MI 2009 with stent Hx                 -ECG suggests past AWMI                 -Ech

## 2019-05-03 NOTE — PLAN OF CARE
Tele monitoring. I/o. Daily weight. Isolation for history of mrsa to wound. Bed alarm on. Dr mehta regarding clarification of mirapex bid prn with new orders received. 0100 Pt became verbally abusive to nurse regarding mirapex.   Nurse told pt that she wo Educate and encourage patient/family in tolerated functional activity level and precautions during self-care      5/3/2019 0117 by Praful Castro RN  Outcome: Progressing  5/3/2019 0113 by Praful Castro RN  Reactivated     Problem: RESPIRATORY - RAHUL Mio Currie RN  Outcome: Progressing  5/3/2019 0113 by Mio Currie RN  Reactivated     Problem: RESPIRATORY - ADULT  Goal: Achieves optimal ventilation and oxygenation  Description  INTERVENTIONS:  - Assess for changes in respiratory status  -

## 2019-05-03 NOTE — CM/SW NOTE
MILADIS order \"Medi car to pick pt up at 1000.  told pt that this was already arranged\". MILADIS s/w pt to confirm that he wanted SW to arrange the medicar. He states that he is ready to go today but wanted to speak with MD first. RN informed.      Masoud Duffy

## 2019-05-13 PROBLEM — I10 BENIGN ESSENTIAL HTN: Status: ACTIVE | Noted: 2019-01-01

## 2019-06-20 NOTE — H&P
[facility] Hospitalist H&P       CC: hyperkalemia     PCP: Tanya Mack MD    History of Present Illness: Patient is a 76year old male with PMH sig for CAD, HTN, HL, hx of alcoholism (last drink 27 yrs ago), current smoker, R lung ca s/p resection and COPD, recent Jan/2015   • CERVICAL FACET INJECTION Bilateral 6/14/2013    Performed by Sera Dowell MD at [address]   • CERVICAL FACET INJECTION Bilateral 5/28/2013    Performed by Sera Dowell MD at [address]   • 60 MG Oral Cap DR Particles TAKE ONE CAPSULE BY MOUTH ONE TIME DAILY  Disp: 90 capsule Rfl: 0   gabapentin 800 MG Oral Tab Take 1 tablet (800 mg total) by mouth 4 (four) times daily.  Disp: 120 tablet Rfl: 2   temazepam 30 MG Oral Cap Take 1 capsule (30 mg 89/47 (BP Location: Right arm)   Pulse 66   Temp 97.5 °F (36.4 °C) (Oral)   Resp 18   Ht 167.6 cm (5' 6\")   Wt 157 lb 6.5 oz (71.4 kg)   SpO2 100%   BMI 25.41 kg/m²   General:  Alert, no distress, appears stated age.    Head:  Normocephalic, without obviou angle is present. Heart, mediastinum and pulmonary vascularity are unremarkable. There is no evidence of left effusion. 50% wedge compression fractures two adjacent mid thoracic bodies are seen.  Plate and screws are in the projection of the lower cervical diff  -discussed that he needs GI eval for cscope  -will check TSH  -metamucil initiated to help bulk stools    dispo - monitor on tele    Outpatient records reviewed confirming patient's medical history and medications.      Further recommendations pending

## 2019-06-20 NOTE — HISTORICAL OFFICE NOTE
Signed               Gertrudis Parents. is a 76year old male. Patient presents with: Follow - Up  ________________________________________________________________________  HPI:  The patient returns for follow up.   His insight into his conditions remains Pantoprazole Sodium 40 MG Oral Tab EC Take 1 tablet (40 mg total) by mouth 2 (two) times daily before meals. Disp: 180 tablet Rfl: 3   Meclizine HCl 25 MG Oral Tab Take 1 tablet (25 mg total) by mouth 3 (three) times daily as needed for Dizziness.  Disp: 30 Abdomen: The abdomen is soft, non-distended, and non-tender. Bowel sounds are present and normoactive. No organomegaly is appreciated. No palpable pulsatile enlargement of the abdominal aortic is appreciated to suggest aneurysm.   Extremities:  Extremit 1.  Cath has been avised given cardiomyopathy nd abn stress. The nature of cardiac catheterization has been reviewed in detail. Risks including, but not limited to the major risks of CVA, MI, and death have been reviewed.   The potential for percutaneous

## 2019-06-20 NOTE — PROGRESS NOTES
(+) ortho, pt denies dizziness       06/20/19 1606 06/20/19 1608 06/20/19 1610   Vital Signs   /43 110/48 (!) 89/47   BP Location Right arm Right arm Right arm   BP Method Automatic Automatic Automatic   Patient Position Lying Sitting Standing

## 2019-06-20 NOTE — CONSULTS
[facility]    Report of Consultation    David Dougherty.  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   • Heart attack ([facility]    • High blood pressure    • High cholesterol    • MI (myocardial infarction) ([facility] 2009   • Neuropathy    • Neuropathy     severe both feet - sees Pain Management team- Dr Nicola Sebastian   • Osteoarthrosis, unspecif Ex lap, repair perforated stomach   • REMOVAL OF LUNG,LOBECTOMY     • SPINAL CORD STIMULATOR IMPLANTATION N/A 1/25/2017    Performed by Carlos Alberto Shaikh MD at [address] [place] [address] N/A 12/22/2015    Performed by Carlos Alberto Shaikh, no leg edema  Neurologic/Psych: mentating well, no asterixis  Skin: No rashes    LABORATORY DATA:       Lab Results   Component Value Date     (H) 06/20/2019    BUN 78 (H) 06/20/2019    BUNCREA 31.8 (H) 06/20/2019    CREATSERUM 2.45 (H) 06/20/2019 LEUUR Negative 11/20/2018    NMIC Microscopic not indicated 11/20/2018    WBCUR 1-4 05/08/2018    RBCUR 0-2 05/08/2018    EPIUR None Seen 05/08/2018    BACUR None Seen 05/08/2018         IMAGING:     All imaging studies personally reviewed.             JEAN

## 2019-06-21 NOTE — PROGRESS NOTES
Potassium this morning 6.2. Call received from Dr Yamilet Solis with orders received. Plan of care updated with patient. Meds already given to patient.

## 2019-06-21 NOTE — PROGRESS NOTES
Patient is not a diabetic but was given 10 units of novolog per hyperkalemia protocol earlier today. 6930 patient assisted back from bathroom with slurred speech and feeling shaky. Blood glucose check was 28. Amp of d50 given with recheck of 109.   Salvador

## 2019-06-21 NOTE — PROGRESS NOTES
[facility]    Progress Note    Mariaelena Mckeon.  Patient Status:  Observation    12/10/1950 MRN [id number]   [facility] 2NE-A Attending Cole Meza MD   [facility] Day # 0 PCP Tesha Cheung MD       Subjective:     Potassium levels fin Kade Irvin MD on 6/20/2019 at 21:46     Approved by: Elizabeth Bee MD            Ekg 12-lead    Result Date: 6/21/2019  Sinus bradycardia with 1st degree A-V block with occasional Premature ventricular complexes Septal infarct (cited on or before 16-OCT-20 Cards on consult     Jenny Melgar MD  [address] [facility]  Nephrology

## 2019-06-21 NOTE — PROGRESS NOTES
[facility] Hospitalist Progress Note     PCP: Tika Simeon MD    SUBJECTIVE:  No CP, SOB, or N/V.    OBJECTIVE:  Temp:  [96.6 °F (35.9 °C)-98.1 °F (36.7 °C)] 96.6 °F (35.9 °C)  Pulse:  [60-88] 64  Resp:  [13-20] 18  BP: ()/(37-81) 122/52    Intake/Outp renal cysts measuring up to 5.5 cm within the inferior left kidney.     Meds:     • Fluticasone Furoate-Vilanterol  1 puff Inhalation Daily   • carvedilol  6.25 mg Oral BID with meals   • atorvastatin  10 mg Oral Nightly   • aspirin EC  81 mg Oral Daily   • this with patient, as well as avoidance of NSAIDs.   ASA 81 ordered per cardiology  -cont coreg  -LHC when ok per renal and cards     COPD with mild exacerbation  -sees Dr. Stacie Lai as outpt   -apprec pulm consult - pred has been stopped  -start breo for main

## 2019-06-21 NOTE — PLAN OF CARE
Assumed care of pt @ 1900. Pt AOx3, forgetful at times. Bed alarm in place for safety. On RA, denies SOB. NSR on tele, peaked T waves noted. Pt due to void. Contact plus isolation maintained for R/o Cdiff. Pt denies pain.      Dr. Marquez Fraction updated on BMP res

## 2019-06-21 NOTE — PLAN OF CARE
Assumed care of patient @ 0730, A/OX4, answers questions appropriately. Pt came in for hyperkalemia and abn kidney levels. POC gluc 119 @ 0803, gluc 169 @ 0912, pt is not a diabetic, received insulin novolin 10 U yesterday for hyperkalemia protocol.  Pt c

## 2019-06-21 NOTE — PROGRESS NOTES
[facility] Cardiology  Progress Note    Floyd Amato.  Patient Status:  Observation    12/10/1950 MRN [id number]   [facility] 2NE-A Attending Yeison Zazueta MD   Hosp Day # 0 PCP Arthur Ortiz MD     Subjective:   Noted hypo : 161 lb (73 kg)      Allergies:    No Known Allergies          Physical Exam:   General:  Well-developed / Well-nourished. No acute distress. HEENT:  Normocephalic. Atraumatic. No icterus. Neck:  There is no jugular venous distention.    Gautam Beaver Hypokinesis of the     anteroseptal myocardium. Doppler parameters are consistent with abnormal     left ventricular relaxation - grade 1 diastolic dysfunction. 2. Mitral valve: There was moderate regurgitation.   3. Left atrium: The left atrial volume was input  9. Defer cath until medically stabilized.       Nelson Shaw MD  6/21/2019  8:39 AM

## 2019-06-21 NOTE — CONSULTS
Pulmonary H&P/Consult       NAME: Lynette Peña St: [id number] - MRN: [id number] - Age: 76year old - :  12/10/1950    Date of Admission: 2019  1:38 PM  Admission Diagnosis: PAF (paroxysmal atrial fibrillation) (HCC) [I48.0]  CHF (congestive Dr Shayy De La O   • Osteoarthrosis, unspecified whether generalized or localized, unspecified site    • Other and unspecified hyperlipidemia    • Vertigo     11/2014   • Visual impairment     Rt eye severe impairment      Past Surgical History:   Procedure Lat 12/22/2015    Performed by Sera Dowell MD at [facility] MAIN OR   • STIMULATOR TRIAL EPIDURAL LEAD Bilateral 11/11/2015    Performed by Sera Dowell MD at [address] [place] [address] Right 6/19/2015    Performed by Josie Macdonald, DENTON Chamberlain (MIRAPEX) 0.25 MG Oral Tab Take 1 tablet (0.25 mg total) by mouth nightly.  (Patient taking differently: Take 0.25 mg by mouth 2 (two) times daily as needed.  ) Disp: 90 tablet Rfl: 3 Unknown at Unknown time   Meclizine HCl 25 MG Oral Tab Take 1 tablet (25 Forced sexual activity: Not on file    Other Topics      Concerns:        Not on file    Social History Narrative      Not on file         Family History:  -denies family h/o COPD     Home Medications:    Outpatient Medications Marked as Taking for the 6/2 mg Oral QID   • Pantoprazole Sodium  40 mg Oral BID AC   • Pramipexole Dihydrochloride  0.25 mg Oral Nightly   • Fluticasone Propionate  2 spray Each Nare Daily   • psyllium  1 packet Oral Daily   • nicotine  1 patch Transdermal Daily   • predniSONE  40 mg Wt 161 lb 2.5 oz (73.1 kg)   SpO2 100%   BMI 26.01 kg/m²     General Appearance:    Alert, cooperative, no distress, appears stated age   Head:    Normocephalic, without obvious abnormality, atraumatic   Eyes:    PERRL, conjunctiva/corneas clear, EOM's int 1.11 (H) 0.93 - 1.07 Final     Comment:     INR Therapeutic Interval Group A (2.00-3.00)  Venous Thrombosis, Pulmonary   Systemic Thrombosis,  Tissue Heart Valve, Acute Myocardial Infarction,  Valvular Heart Disease or Atrial Fibrillation  INR Therapeutic

## 2019-06-21 NOTE — PROGRESS NOTES
NURSING ADMISSION NOTE      Patient admitted via Wheelchair  Oriented to room. Safety precautions initiated. Bed in low position. Call light in reach. Pt received from cardiac cath holding.  Pt came in for elective LHC - Potassium 7.4, creatinine

## 2019-06-22 NOTE — PROGRESS NOTES
[facility] Cardiology  Progress Note    Jesus Msoes.  Patient Status:  Observation    12/10/1950 MRN [id number]   [facility] 2NE-A Attending Anjana Larson MD   [facility] Day # 1 PCP Clarisa Xie MD     Subjective:   No chest p distention. Cardiovascular:  Cardiovascular examination demonstrates a regular rate and rhythm. There is normal S1, S2. There is no S3 or S4. There are no rubs or gallops. Grade 2/6 ES at apex. No click is appreciated.   PMI is nondisplaced with a n parameters are consistent with abnormal     left ventricular relaxation - grade 1 diastolic dysfunction. 2. Mitral valve: There was moderate regurgitation. 3. Left atrium: The left atrial volume was mildly increased.   4. Pulmonary arteries: Systolic pres until medically stabilized / renal stable.       Nathalia Burns MD  6/22/2019

## 2019-06-22 NOTE — PROGRESS NOTES
[facility] hospitalist daily note  patient was seen/examined on 6/22/19.  Late entry    S: pt's only complaint is that he did not sleep last night, asking for temazepam tonight  Denies bleeding    Medications in Epic    Pe    06/22/19  1636   BP: 93/51   Pulse: 98 nml T4 - will have pt f/u with PCP regarding this and recheck thyroid labs as outpatient  -metamucil initiated to help bulk stools      Renal cysts  Follow up with renal and nephrology    Insomnia; temazepam ordered    Preventative SCD    Rose López MD

## 2019-06-22 NOTE — PLAN OF CARE
Assumed care of patient around 0730. Pt a/o x 4, RA, SR on tele monitor. Denies chest pain/discomfort or SOB at present. Ambulated in the hallway as tolerated. C/o neck pain this a.m., given tylenol and warm packs.  Patient updated on plan of care, Nathalie Segura Progressing     Problem: Diabetes/Glucose Control  Goal: Glucose maintained within prescribed range  Description  INTERVENTIONS:  - Monitor Blood Glucose as ordered  - Assess for signs and symptoms of hyperglycemia and hypoglycemia  - Administer ordered me

## 2019-06-22 NOTE — PROGRESS NOTES
[facility]    Progress Note    Bruno Phelps.  Patient Status:  Observation    12/10/1950 MRN [id number]   [facility] 2NE-A Attending Aidee Goel MD   [facility] Day # 1 PCP Daria Díaz MD       Subjective:     Potassium levels nor Date: 6/21/2019  Sinus bradycardia with 1st degree A-V block with occasional Premature ventricular complexes Septal infarct (cited on or before 16-OCT-2009) Abnormal ECG When compared with ECG of 20-JUN-2019 15:01, Premature ventricular complexes are now P

## 2019-06-22 NOTE — PLAN OF CARE
ALERT AND ORIENTED BUT FORGETFUL ON ON TELE MONITOR HR 80'S SINUS RHYTHM. NA BICARB GTT  CC/HR IN PROGRESS PATENT AND INTACT. DENIES ANY PAIN. UPDATED W/ POC AND VERBALIZED UNDERSTANDING. ALL NEEDS ATTENDED AND WILL CONTINUE TO MONITOR.  Marija Bardales.

## 2019-06-23 NOTE — PLAN OF CARE
Problem: SAFETY ADULT - FALL  Goal: Free from fall injury  Description  INTERVENTIONS:  - Assess pt frequently for physical needs  - Identify cognitive and physical deficits and behaviors that affect risk of falls.   - Stark fall precautions as i range  - Assess barriers to adequate nutritional intake and initiate nutrition consult as needed  - Instruct patient on self management of diabetes  Outcome: Completed

## 2019-06-23 NOTE — PLAN OF CARE
ALERT AND ORIENTED BUT FORGETFUL ON TELE MONITOR HR 80'S SINUS RHYTHM. DENIES ANY PAIN. UPDATED W/ POC AND VERBALIZED UNDERSTANDING. ALL NEEDS ATTENDED AND WILL CONTINUE TO MONITOR. CALL LIGHT WITHIN REACH.   Problem: SAFETY ADULT - FALL  Goal: Free from fa

## 2019-06-23 NOTE — PROGRESS NOTES
[address] [facility] Cardiology  Progress Note    Marito Pillai.  Patient Status:  Observation    12/10/1950 MRN [id number]   [facility] 2NE-A Attending Moriah Rutherford MD   [address] Day # 2 PCP Marci Laughlin MD     Subjective:   No chest p acute distress. HEENT:  Normocephalic. Atraumatic. No icterus. Neck:  There is no jugular venous distention. Cardiovascular:  Cardiovascular examination demonstrates a regular rate and rhythm. There is normal S1, S2. There is no S3 or S4.   There ar The cavity size was mildly increased. Wall thickness was     normal. The estimated ejection fraction was 45-50%. Hypokinesis of the     anteroseptal myocardium.  Doppler parameters are consistent with abnormal     left ventricular relaxation - grade 1 diast re-introduce if OK with renal in the future  4. ASA low dose  5. Statin  6. Defer cath for 1-2 weeks until medically stabilized / renal stable. 7.  F/U 2 weeks. 8.  Replete Mg. Home today after Mg normalized.       Fátima Mullen MD  6/23/2019

## 2019-06-23 NOTE — CM/SW NOTE
06/23/19 1600   Discharge disposition   Expected discharge disposition Home or Self   Discharge transportation Tg Anne     Per RN pt needs medicar tranpsort home.  Patricia Glass [id number] sending BLS at medicar rate at 6:30pm.    Forestine Councilman, LCSW  pg

## 2019-06-23 NOTE — PROGRESS NOTES
[facility]    Progress Note    Teshachrista Cisneros.  Patient Status:  Observation    12/10/1950 MRN [id number]   [facility] 2NE-A Attending Tiffanie Huber MD   [facility] Day # 2 PCP Miguel Pineda MD       Subjective:     Potassium levels nor preop labs noted to have ROBB w Cr 2.45 and Hyperkalemia to 7.4 for which renal was consulted.     ROBB on CKD3 (b/l 1.2-1.4s)  -- ROBB likely prerenal due to poor po intake/diarrhea + diuretics + ACEi   -- FeNa >1% but was on diuretics  -- Renal ultrasound w

## 2019-06-24 NOTE — PLAN OF CARE
NURSING DISCHARGE NOTE    Discharged Home via Ambulance. Accompanied by Support staff  Belongings Taken by patient/family. IV removed, IV catheter intact. D/c instructions given. New meds eprescribed.    Patient states he is able to obtain all m

## 2019-08-29 PROBLEM — I50.9 CONGESTIVE HEART FAILURE, UNSPECIFIED HF CHRONICITY, UNSPECIFIED HEART FAILURE TYPE (HCC): Status: ACTIVE | Noted: 2019-01-01

## 2019-08-29 NOTE — PROGRESS NOTES
Patient here for cardiac cath with Dr Kailyn Rose. Pre-procedure BMP with potassium of 5.6, called to Dr Kailyn Rose who came to speak with patient and daughter. He would like to admit patient overnight, have renal address potassium, and cath patient tomorrow.  Salvador

## 2019-08-29 NOTE — H&P
[facility] Cardiology  Report of Consultation    Ernstreema Yasharun.  Patient Status:  Outpatient in a Bed    12/10/1950 MRN [id number]   [facility] Attending Aidee Goel MD   Hosp Day # 0 PCP Daria Navass infarction) [facility]) 2009   • Neuropathy    • Neuropathy     severe both feet - sees Pain Management team- Dr Geri Paredes   • Osteoarthrosis, unspecified whether generalized or localized, unspecified site    • Other and unspecified hyperlipidemia    • Vertigo Dihydrochloride (MIRAPEX) 0.25 MG Oral Tab Take 1 tablet (0.25 mg total) by mouth nightly.  (Patient taking differently: Take 0.25 mg by mouth 2 (two) times daily as needed.  ) Disp: 90 tablet Rfl: 3   Triamcinolone Acetonide (NASACORT ALLERGY 24HR) 55 MCG/ cyanosis or clubbing of the digits is appreciated. Femoral, Dorsalis Pedis, and Posterior Tibialis  pulses are 2+ and equal in a symmetric fashion. Neurologic: Alert and oriented, normal affect. No gross deficit appreciated.   Integument:  No visible simeon multi-vessel disease of    the left ventricle with stress. - Abnormal nuclear perfusion study. - There is evidence of myocardial ischemia. - There is evidence of myocardial infarction.  - Normal regional wall thickening is noted on gated SPECT analysis.

## 2019-08-29 NOTE — CONSULTS
[address] Nephrology  Inpatient Consultation    Shannan Hammonds.  Patient Status:  Outpatient in a Bed    12/10/1950 MRN [id number]   Location [address] [facility] Attending Zachary Peng MD   Hosp Day meals  •  aspirin EC tab 81 mg, 81 mg, Oral, Daily  •  [START ON 8/30/2019] 0.9% NaCl infusion, , Intravenous, On Call  •  furosemide (LASIX) injection 40 mg, 40 mg, Intravenous, Once      Medications Prior to Admission:  gabapentin 800 MG Oral Tab Take 1 10 MG Oral Tab Take 10 mg by mouth daily. Disp:  Rfl:  Past Week at Unknown time   Meclizine HCl 25 MG Oral Tab Take 1 tablet (25 mg total) by mouth 3 (three) times daily as needed for Dizziness.  Disp: 30 tablet Rfl: 1 Past Month at Unknown time   acetamin Dasia Jade MD at [address]   • CERVICAL FACET INJECTION Bilateral 5/28/2013    Performed by Danielle Casas MD at [address] [facility] N/A 9/30/2013    Performed by Danielle Casas MD at [facility]  Eyes: Negative for blurred vision. Respiratory: Positive for shortness of breath and wheezing. Cardiovascular: Negative. Gastrointestinal: Negative. Genitourinary: Negative. Negative for frequency. Neurological: Positive for tingling.    Psy 08/26/2019    AST 18 08/26/2019    ALT 12 08/26/2019    BILT 0.28 08/26/2019    TP 7.4 08/26/2019    ALB 4.2 08/26/2019    GLOBULIN 4.0 04/30/2019    AGRATIO 1.6 10/13/2015     08/29/2019    K 5.6 (H) 08/29/2019     08/29/2019    CO2 21.0 08/29 Congestive Heart Failure  #. Excess fluid volume    #.  Non-anion gap metabolic acidosis    RECOMMENDATIONS:   - Starting Sodium Bicarbonate 650mg po tid to address metabolic acidosis and help with potassium shift  - Starting patiromer 8.4 g once daily now

## 2019-08-30 NOTE — PLAN OF CARE
8/30/2019    Pt A&Ox4. RA. NSR on tele. Continent of B and B. SBA. Pt has chronic diarrhea. Gave Immodium. Had some form stool mixed with loose stool so did not send stool sample. Pt complained of neuropathic pain to the feet.  Pt wanted to walk in the cabrera

## 2019-08-30 NOTE — CONSULTS
[facility] Hospitalist Initial Consult       Reason for Consult: Medical Management of hyperkalemia/renal insufficiency      History of Present Illness: Patient is a 76year old male with PMH sig for CAD, cardiomyopathy, PAF, CODP, hx of lung CA and renal insuffi • Heparin Sodium (Porcine)  5,000 Units Subcutaneous [facility]   • cetirizine  10 mg Oral QPM   • DULoxetine HCl  60 mg Oral Daily   • ferrous sulfate  325 mg Oral Daily with breakfast   • Fluticasone Furoate-Vilanterol  1 puff Inhalation Daily   • gabapen RESECTION N/A 5/8/2018    Performed by Bhavna Cox MD at [address]   • OTHER SURGICAL HISTORY  03/07    C3,4,5 disk repair   • OTHER SURGICAL HISTORY  05/08/2018    Ex lap, repair perforated stomach   • REMOVAL OF LUNG,LOBECTOMY     • SPINAL CORD STIM 104 107 106   CO2 20.1* 21.0 25.0   BUN 29.0* 43* 47*   CREATSERUM 1.29* 1.52* 1.44*   CA 9.3 9.4 9.3   * 87 85       Recent Labs   Lab 08/26/19  1315   ALT 12   AST 18   ALB 4.2       No results for input(s): PGLU in the last 168 hours.     No resul

## 2019-08-30 NOTE — PROCEDURES
[facility]    PATIENT'S NAME: Yenny Murray JR   ATTENDING PHYSICIAN: Precious Oneill M.D. OPERATING PHYSICIAN: Yinka Yusuf.  Ct Stewart MD   PATIENT ACCOUNT#:   [id number]    LOCATION:  [address] [place] [address]  MEDICAL RECORD #:   [id number]       DATE OF BIRTH:  [birth year] PDA.  There is moderate diffuse disease throughout the circumflex system. RIGHT CORONARY:  The right coronary is a moderate caliber, nondominant vessel with an ostial 90% stenosis (noted on prior study). HEMODYNAMICS:  LVEDP was 20 mmHg.   There was n

## 2019-08-30 NOTE — PLAN OF CARE
8/30/2019    Pt still complaining of RLS and insomnia after receiving meds. Pt went for another walk in the hallways.  Pt back in bed and trying to fall asleep

## 2019-08-30 NOTE — RESPIRATORY THERAPY NOTE
Breo Ellipta inhaler reviewed with patient. First dose administered by respiratory. Pt demonstrated proper technique. Mouth was rinsed with water after inhaler taken. Breo Ellipta inhaler passed on to nursing to administer starting tomorrow 8/30/19.

## 2019-08-30 NOTE — PROGRESS NOTES
[facility] Cardiology  Progress Note    Vanessa Cortez.  Patient Status:  Observation    12/10/1950 MRN [id number]   [facility] 2NE-A Attending Parker Gosselin, MD   [facility] Day # 0 PCP Eliud Samaniego MD     Subjective:   No chest p distress. HEENT:  Normocephalic. Atraumatic. No icterus. Neck:  There is no jugular venous distention. Cardiovascular:  Cardiovascular examination demonstrates a regular rate and rhythm. There is normal S1, S2. There is no S3 or S4.   There are no r abnormal     left ventricular relaxation - grade 1 diastolic dysfunction. 2. Mitral valve: There was moderate regurgitation. 3. Left atrium: The left atrial volume was mildly increased.   4. Pulmonary arteries: Systolic pressure was mildly to moderately i service  4. Cath today. The nature of cardiac catheterization has been reviewed in detail. Risks including, but not limited to the major risks of CVA, MI, and death have been reviewed.   The potential for percutaneous coronary intervention has been revie

## 2019-08-30 NOTE — PROGRESS NOTES
[facility]    Nephrology Progress Note    Sierra Macedo. Attending:  Yobany Horn MD       SUBJECTIVE:   Sierra Macedo. reports he did not sleep well last night. He was bothered by his peripheral neuropathy symptoms.  He also struggles with chr 02/22/2017    GFRNAA 50 (L) 08/30/2019    GFRAA 57 (L) 08/30/2019    CA 9.3 08/30/2019    OSMOCALC 300 (H) 08/30/2019    ALKPHO 101 08/26/2019    AST 18 08/26/2019    ALT 12 08/26/2019    BILT 0.28 08/26/2019    TP 7.4 08/26/2019    ALB 4.2 08/26/2019    G 8.4 g packet 1 packet 8.4 g Oral Daily   0.9% NaCl infusion  Intravenous Continuous   atorvastatin (LIPITOR) tab 40 mg 40 mg Oral Nightly   carvedilol (COREG) tab 6.25 mg 6.25 mg Oral BID with meals   aspirin EC tab 81 mg 81 mg Oral Daily   Heparin Sodium follow up.      Landon Valentin MD   Nephrology  [address]   [phone]

## 2019-08-30 NOTE — PLAN OF CARE
Patient is alert and oriented upon arrival from cath lab. Catheterization cancelled due to hyperkalemia and concern for renal function. IV fluids administered as ordered. Patient admission data base completed and patient orientated to room.  Vitals stable a

## 2019-08-30 NOTE — PROGRESS NOTES
Cath:    Moderate, non-obstructive CAD (non-dominant RCA with 90% ostial stenosis, noted in 2014). Radial.    Dication to follow. PLAN: Suggest medical management. Stents patent. No \"culprit\" lesion(s).     SIMON DE LA PAZ

## 2019-08-31 NOTE — PLAN OF CARE
Patient is alert and oriented, complaining of restless leg this afternoon, Mirapex given as ordered. Patient went to cath lab this morning, no intervention. Right procedure site is clean, dry and intact, no complications.  Patient up to chair and up ambulat

## 2020-07-16 NOTE — CONSULTS
[facility].  Patient Status:  Inpatient    12/10/1950 MRN [id number]   [facility] 8NE-A Attending Abhijit Campos MD   [facility] Day # 1 PCP Sue Antony MD     Date of Admission: 2019  1:50 PM  Admission Diagnos LVM to call back reguarding message below.   • Visual impairment     Rt eye severe impairment      Past Surgical History:   Procedure Laterality Date   • BRONCHOSCOPY N/A 4/8/2015    Performed by Roya Batres MD at [address] [facility] [address]   • [place] (BMS)     • CATH DRUG ELUTING STENT  2014 FOR PAIN MANAGEMENT   • THORACOTOMY Right 6/19/2015    Performed by Josie Macdonald, Gricel Sheikh MD at [address]          No Known Allergies           Social History:   reports that he has been smoking cigarettes.   He has a 45.00 pack-year smoking mouth daily.    Disp:  Rfl:         Current Medications:    Current Facility-Administered Medications:   •  predniSONE (DELTASONE) tab 40 mg, 40 mg, Oral, Daily with breakfast  •  acetaminophen (TYLENOL EXTRA STRENGTH) tab 1,000 mg, 1,000 mg, Oral, Q6H PRN (1.676 m) 135 lb (61.2 kg)     O2 requirement: on room air    Wt Readings from Last 3 Encounters:  04/29/19 : 134 lb 11.2 oz (61.1 kg)  03/20/19 : 150 lb 6.4 oz (68.2 kg)  02/06/19 : 145 lb (65.8 kg)       I/O last 3 completed shifts:   In: 240 [P.O.:240] ASSESSMENT/PLAN:  1. CHF exacerbation- diuresis per cards- clinically already doing better  - afib rate control  2.  AECOPD- mild; in conjunction with volume OL  - brief prednisone burst  - BD nebs per protocol  - encouraged pt to use OP inhalers- has dec

## 2022-11-26 NOTE — ANESTHESIA POSTPROCEDURE EVALUATION
Encounter Date: 11/26/2022       History     Chief Complaint   Patient presents with    Finger Pain     Pt to er c/o possibly having a f.b. under right thumb nail x 2 days. States noticed pus when trying to remove himself.     55 y.o. White male with no known medical history presents to Emergency Department with a chief complaint of R thumb pain. Symptoms began 2 days ago and have been worsening since onset. His finger pain is currently rated as a 7/10 in severity with no radiation. Associated symptoms include warmth, purulent drainage, and swelling to R thumb. The patient denies injury, CP, SOB, weakness, fever, or chills. He reports taking Ibuprofen prior to arrival with mild relief of symptoms. No other reported symptoms at this time.       The history is provided by the patient. No  was used.   Finger Pain  This is a new problem. The current episode started 2 days ago. The problem occurs constantly. The problem has been gradually worsening. Pertinent negatives include no chest pain, no abdominal pain, no headaches and no shortness of breath. The symptoms are aggravated by exertion.   Review of patient's allergies indicates:  No Known Allergies  History reviewed. No pertinent past medical history.  Past Surgical History:   Procedure Laterality Date    CHOLECYSTECTOMY      VASECTOMY       No family history on file.  Social History     Tobacco Use    Smoking status: Some Days     Types: Vaping with nicotine    Smokeless tobacco: Never   Substance Use Topics    Alcohol use: Not Currently    Drug use: Never     Review of Systems   Constitutional:  Negative for chills, fatigue and fever.   Eyes:  Negative for photophobia and visual disturbance.   Respiratory:  Negative for chest tightness, shortness of breath and wheezing.    Cardiovascular:  Negative for chest pain and leg swelling.   Gastrointestinal:  Negative for abdominal pain, nausea and vomiting.   Musculoskeletal:  Positive for arthralgias  [facility].  Patient Status:  Emergency   Age/Gender 79year old male MRN [id number]   [facility] SURGERY Attending Dontae Torres, [facility] Se Day # 0 PCP Pat Choe MD       Anesthesia Post-op Note    Procedure(s): and joint swelling. Negative for gait problem.   Skin:  Positive for color change.   Neurological:  Negative for dizziness, tremors, weakness and headaches.   All other systems reviewed and are negative.    Physical Exam     Initial Vitals [11/26/22 1539]   BP Pulse Resp Temp SpO2   (!) 145/94 97 20 97.3 °F (36.3 °C) 96 %      MAP       --         Physical Exam    Nursing note and vitals reviewed.  Constitutional: He appears well-developed and well-nourished. He is not diaphoretic. No distress.   HENT:   Head: Normocephalic.   Right Ear: External ear normal.   Left Ear: External ear normal.   Nose: Nose normal.   Mouth/Throat: Oropharynx is clear and moist. No oropharyngeal exudate.   Eyes: Conjunctivae and EOM are normal. Pupils are equal, round, and reactive to light. Right eye exhibits no discharge. Left eye exhibits no discharge.   Neck: Neck supple. No JVD present.   Normal range of motion.  Cardiovascular:  Normal rate, regular rhythm, normal heart sounds and intact distal pulses.           Pulmonary/Chest: Breath sounds normal. No stridor. No respiratory distress. He has no wheezes. He has no rhonchi.   Abdominal: Abdomen is soft. Bowel sounds are normal. He exhibits no distension. There is no abdominal tenderness. There is no guarding.   Musculoskeletal:         General: Tenderness (to R thumb) and edema (to R thumb) present. Normal range of motion.      Right hand: Swelling and tenderness present. Normal range of motion. Normal sensation. Normal capillary refill. Normal pulse.      Left hand: Normal.        Hands:       Cervical back: Normal range of motion and neck supple.     Neurological: He is alert and oriented to person, place, and time. He has normal strength. No sensory deficit. GCS score is 15. GCS eye subscore is 4. GCS verbal subscore is 5. GCS motor subscore is 6.   Skin: Skin is warm and dry. Capillary refill takes less than 2 seconds. No rash noted. There is erythema (to R thumb).    Psychiatric: He has a normal mood and affect. Thought content normal.       ED Course   Procedures  Labs Reviewed - No data to display       Imaging Results              X-Ray Finger 2 or More Views Right (Final result)  Result time 11/26/22 16:40:45      Final result by Harley Odom MD (11/26/22 16:40:45)                   Impression:      No acute osseous abnormality identified.      Electronically signed by: Harley Odom  Date:    11/26/2022  Time:    16:40               Narrative:    EXAMINATION:  XR FINGER 2 OR MORE VIEWS RIGHT    CLINICAL HISTORY:  finger pain;    TECHNIQUE:  Three-view    COMPARISON:  None available    FINDINGS:  There is small well corticated ossification adjacent to the base of the distal phalanx.  No acute fracture or dislocation identified.  There are no significant degenerative osteoarthritic changes.                                       Medications   naproxen tablet 500 mg (500 mg Oral Not Given 11/26/22 1600)     Medical Decision Making:   Differential Diagnosis:   Felon, Paronychia, Cellulitis   Clinical Tests:   Radiological Study: Ordered and Reviewed  ED Management:  Due to patient's complaints and since patient feels foreign body sensation under fingernail, imaging ordered. Imaging unremarkable. Prescribed Bactrim since drainage purulent and patient's symptoms. Patient denies new or additional complaints; no further tests indicated at this time. Verbalized understanding of instructions. No emergent or apparent distress noted prior to discharge. To follow up with PCP in 1 week as needed. Instructed to perform warm compresses. Strict ER return precautions given.                           Clinical Impression:   Final diagnoses:  [L03.011] Paronychia of right thumb (Primary)  [M79.644] Pain of right thumb      ED Disposition Condition    Discharge Stable          ED Prescriptions       Medication Sig Dispense Start Date End Date Auth. Provider    naproxen (NAPROSYN) 500 MG  tablet Take 1 tablet (500 mg total) by mouth 2 (two) times daily as needed (Take twice a day with food as needed for pain.). 30 tablet 11/26/2022 -- Molly Nieto NP    sulfamethoxazole-trimethoprim 800-160mg (BACTRIM DS) 800-160 mg Tab Take 1 tablet by mouth 2 (two) times daily. for 7 days 14 tablet 11/26/2022 12/3/2022 Molly Nieto NP          Follow-up Information       Follow up With Specialties Details Why Contact Info    Cristhian Saunders MD Internal Medicine Call in 1 week If symptoms worsen, As needed [address] [facility] [address]  [phone]      [facility] Orthopaedics - Emergency Dept Emergency Medicine Go to  If symptoms worsen, As needed [address]  [facility] [address] [phone]             Molly Nieto NP  11/26/22 8657

## 2024-10-08 NOTE — DISCHARGE SUMMARY
[facility] Medicine Discharge Summary    Patient ID:  Celso April  [id number]  76year old  12/10/1950    Admit date: 4/29/2019  Discharge date and time: 5/2/19  Attending Physician: Quinn Hicks MD  Primary Care Physician: Levi Omalley MD the ED in CHF/COPD exacerbation. Very poor historian, tangential and difficult to redirect.  Fixates on his neuropathy but does have some LE swelling which he states is worse than usual. To me denies SOB but per ED report had complained of AIKEN and orthopone daily.  Start taking on:  5/3/2019  What changed:    · medication strength  · how much to take        CONTINUE taking these medications    atorvastatin 40 MG Tabs  Commonly known as:  LIPITOR  Take 1 tablet (40 mg total) by mouth nightly. carvedilol 6. 4/29/2019  PROCEDURE:  XR CHEST AP PORTABLE  (CPT=71045)  TECHNIQUE:  AP chest radiograph was obtained. COMPARISON:  SARAH , XR CHEST AP PORTABLE  (CPT=71045), 5/12/2018, 2:00.   INDICATIONS:  insomnia  PATIENT STATED HISTORY: (As transcribed by OliveaDenisaColchrista Calm

## 2025-03-04 NOTE — PLAN OF CARE
Copied from CRM #[id number]. Topic: MW Medication/Rx - MW Rx Refill  >> Mar 4, 2025 12:47 PM Lorri PEREZ CMA wrote:  Violet Melara called to request a medication refill and currently has medication during working hrs.  Medication is:    Listed on Med Management list. Pended medication. Selected 'Wrap Up CRM' and created new Refill Med Encounter after clicking 'Convert to Clinical Call'. Sent Med template and routed as routine priority per Care Site Dept KB page for Med Refill Working Hrs support to appropriate clinical pool. Readback full message.-- DO NOT REPLY / DO NOT REPLY ALL --  -- This inbox is not monitored. If this was sent to the wrong provider or department, reroute message to P ECO Reroute pool. --  -- Message is from Engagement Center Operations (ECO) --      Message Type:  Refill Medication   Refill request for Pended medication named: see pended  Preferred pharmacy verified, and selected.   [facility] - [place], [place] - [address]    Is the patient OUT of Medication?  No Medication Refills handled by Care Site - route as routine priority to care site pool on KB. Patient has been advised it will be addressed within 2-3 business days.     Message:                    Problem: DISCHARGE PLANNING - CASE MANAGEMENT  Goal: Discharge to post-acute care or home with appropriate resources  INTERVENTIONS:  - Conduct assessment to determine patient/family and health care team treatment goals, and need for post-acute services ba

## 2025-05-30 NOTE — PLAN OF CARE
Problem: Patient/Family Goals  Goal: Patient/Family Long Term Goal  Patient's Long Term Goal: To be discharged back home    Interventions:  - Perform IS every hour while awake.   - Cough and deep breathe  - See additional Care Plan goals for specific interv Quinolones Counseling:  I discussed with the patient the risks of fluoroquinolones including but not limited to GI upset, allergic reaction, drug rash, diarrhea, dizziness, photosensitivity, yeast infections, liver function test abnormalities, tendonitis/tendon rupture.

## (undated) DEVICE — SUTURE VICRYL 2-0 MO-6

## (undated) DEVICE — BLADE ELECTRODE: Brand: EDGE

## (undated) DEVICE — DRAIN RELIAVAC 100CC

## (undated) DEVICE — LIGASURE IMPACT OPEN DEVICE

## (undated) DEVICE — SUTURE SILK 2-0

## (undated) DEVICE — REM POLYHESIVE ADULT PATIENT RETURN ELECTRODE: Brand: VALLEYLAB

## (undated) DEVICE — SHEET,DRAPE,70X100,STERILE: Brand: MEDLINE

## (undated) DEVICE — SOL  .9 1000ML BTL

## (undated) DEVICE — GLOVE ORTHO ALOETOUCH SZ 8-1/2

## (undated) DEVICE — PROXIMATE SKIN STAPLERS (35 WIDE) CONTAINS 35 STAINLESS STEEL STAPLES (FIXED HEAD): Brand: PROXIMATE

## (undated) DEVICE — SPONGE: SPECIALTY PEANUT XR 100/CS: Brand: MEDICAL ACTION INDUSTRIES

## (undated) DEVICE — UNDYED BRAIDED (POLYGLACTIN 910), SYNTHETIC ABSORBABLE SUTURE: Brand: COATED VICRYL

## (undated) DEVICE — CHLORAPREP 26ML APPLICATOR

## (undated) DEVICE — SUTURE MONOCRYL 4-0 PS-2

## (undated) DEVICE — DRAIN CHANNEL 19FR BLAKE

## (undated) DEVICE — KENDALL SCD EXPRESS SLEEVES, KNEE LENGTH, MEDIUM: Brand: KENDALL SCD

## (undated) DEVICE — MINI LAP PACK-LF: Brand: MEDLINE INDUSTRIES, INC.

## (undated) DEVICE — PROBE COVER 600 SER 05031-110

## (undated) DEVICE — SUTURE SILK 2-0 SH

## (undated) DEVICE — LAPAROTOMY CDS: Brand: MEDLINE INDUSTRIES, INC.

## (undated) DEVICE — 3M™ IOBAN™ 2 ANTIMICROBIAL INCISE DRAPE 6650EZ: Brand: IOBAN™ 2

## (undated) DEVICE — PROVIDES A STERILE INTERFACE BETWEEN THE OPERATING ROOM SURGICAL LAMPS (NON-STERILE) AND THE SURGEON OR NURSE (STERILE).: Brand: STERION®CLAMP COVER FABRIC

## (undated) DEVICE — SYRINGE 20CC LL TIP

## (undated) DEVICE — 3M™ TEGADERM™ TRANSPARENT FILM DRESSING, 1626W, 4 IN X 4-3/4 IN (10 CM X 12 CM), 50 EACH/CARTON, 4 CARTON/CASE: Brand: 3M™ TEGADERM™

## (undated) DEVICE — MEDI-VAC NON-CONDUCTIVE SUCTION TUBING: Brand: CARDINAL HEALTH

## (undated) DEVICE — SUTURE SILK 0

## (undated) DEVICE — INTENDED USED TO PROTECT, TAG AND HELP LOCATED SUTURES DURING SURGERY: Brand: STERION®SUTURE AID BOOTIES

## (undated) DEVICE — GAUZE SPONGES,USP TYPE VII GAUZE, 12 PLY: Brand: CURITY

## (undated) DEVICE — LAMINECTOMY ARM CRADLE FOAM POSITIONER: Brand: CARDINAL HEALTH

## (undated) DEVICE — SUTURE PDS II 1 ST-21

## (undated) NOTE — ED AVS SNAPSHOT
[facility] Emergency Department    [place] [address] [place] [address]    Phone:  [phone]    Fax:  [address]    MRN: [id number]    Department:  [facility] Emergency Department   Date of Visit:  2/2 IF THERE IS ANY CHANGE OR WORSENING OF YOUR CONDITION, CALL YOUR PRIMARY CARE PHYSICIAN AT ONCE OR RETURN IMMEDIATELY TO THE EMERGENCY DEPARTMENT.     If you have been prescribed any medication(s), please fill your prescription right away and begin taking t

## (undated) NOTE — LETTER
[facility] [address], [address]  Consent for Procedure/Sedation    Date:     Time:       1.  I authorize the performance upon Patricio Angelo. the following:cardiac catheterization, left ventricular cineangiography, bilateral alberto period, the physician will determine when the applicable recovery period ends for purposes of reinstating the Do Not Resuscitate (DNR) order.     Signature of Patient: ____________________________________________________    Signature of person authorized

## (undated) NOTE — LETTER
[facility] [address], [address]  Consent for Procedure/Sedation    Date:     Time:       1.  I authorize the performance upon Hamilton Frankel. the following:cardiac catheterization, left ventricular cineangiography, bilateral alberto period, the physician will determine when the applicable recovery period ends for purposes of reinstating the Do Not Resuscitate (DNR) order.     Signature of Patient: ____________________________________________________    Signature of person authorized

## (undated) NOTE — IP AVS SNAPSHOT
[facility] [place] [address] [place], [address] ~ [phone]                Discharge Summary   1/25/2017    [address]            Admission Information        Provider Department    1/25/2017 Sharonda Zavaleta MD  Daly Sampson / Marilyn Rojas Commonly known as:  DIFLUCAN        Take 200 mg by mouth daily. [    ]    [    ]    [    ]    [    ]       Fluticasone Propionate 50 MCG/ACT Susp   Commonly known as:  FLONASE        2 sprays by Each Nare route daily as needed.       [    ]    [    ] [    ]    [    ]    [    ]    [    ]       Vitamin D 2000 UNITS Caps        Take 1 capsule by mouth daily. [    ]    [    ]    [    ]    [    ]                 Patient Instructions       Schedule follow up appointment with Dr. Brenda Goldmann on 1/30/17. 188.0 --    (01/01/17)  8.4 (01/01/17)  3.83 (01/01/17)  10.5 (L) (01/01/17)  33.5 (L) (01/01/17)  87.5 (01/01/17)  27.4 (01/01/17)  31.3  (01/01/17)  234.0     (12/30/16)  5.8 (12/30/16)  3.60 (L) (12/30/16)  9.9 (L) (12/30/16)  31.2 (L) (12/30/16)  86.7 [phone]. - If you don’t have insurance, Hali Aguero has partnered with Patient [address] to help you get signed up for insurance coverage.   Patient Shan Espinoza is a Federal Navigator program that can help with your Affordab

## (undated) NOTE — LETTER
7/25/2018  [facility] [address] [place] [id number]              Dear Usha Gooden,       Here are your results from your recent visit with Gastroenterology.      Stool c.difficile is (-)          If you need any further assistance, please fee

## (undated) NOTE — LETTER
[facility] [address], [address]  Consent for Procedure/Sedation  Date: 08/30/2019    Time:       1.  I authorize the performance upon Balbina Gomez. the following:cardiac catheterization, left ventricular cineangiography, bilat period, the physician will determine when the applicable recovery period ends for purposes of reinstating the Do Not Resuscitate (DNR) order.     Signature of Patient: ____________________________________________________    Signature of person authorized

## (undated) NOTE — LETTER
[facility]  [address] [facility], [address] [place] [address]    Consent for Operation    Date: __________________    Time: _______________    1.  I authorize the performance upon [address] the following operation:    Procedure(s):  exploratory laparot procedure has been videotaped, the surgeon will obtain the original videotape. The hospital will not be responsible for storage or maintenance of this tape.     6. For the purpose of advancing medical education, I consent to the admittance of observers to t STATEMENTS REQUIRING INSERTION OR COMPLETION WERE FILLED IN.     Signature of Patient:   ___________________________    When the patient is a minor or mentally incompetent to give consent:  Signature of person authorized to consent for patient: ____________ drugs/illegal medications). Failure to inform my anesthesiologist about these medicines may increase my risk of anesthetic complications. · If I am allergic to anything or have had a reaction to anesthesia before.     3. I understand how the anesthesia med I have discussed the procedure and information above with the patient (or patient’s representative) and answered their questions. The patient or their representative has agreed to have anesthesia services.     _______________________________________________

## (undated) NOTE — ED AVS SNAPSHOT
[facility] Emergency Department    [place] [address] [place] [address]    Phone:  [phone]    Fax:  [address]    MRN: [id number]    Department:  [facility] Emergency Department   Date of Visit:  2/2 Main [phone]  Pediatric 443 2530 Emergency Department   [phone]       To Check ER Wait Times:  TEXT 'ERwait' to 08631      Click [email or web address]      Or call [phone]    If you have any problems with your follow-up, ple before you leave. After you leave, you should follow the attached instructions. I have read and understand the instructions given to me by my caregivers. 24-Hour Pharmacies        Pharmacy Address Phone Number   [facility] [address] Impression:    CONCLUSION:    1. No acute intracranial process.            Dictated by: Presley Torres MD on 2/22/2017 at 19:41       Approved by: Presley Torres MD              Narrative:    PROCEDURE:  CT BRAIN OR HEAD (68561)     COMPARISON:  [facility]

## (undated) NOTE — IP AVS SNAPSHOT
[address]            (For Outpatient Use Only) Initial Admit Date: 5/8/2018   Inpt/Obs Admit Date: Inpt: 5/8/18 / Obs: N/A   Discharge Date:    Erica Villalobos:  [de-identified]   MRN: [de-identified]   CSN: [id number]        ENCOUNTER  Patient C Hospital Account Financial Class: Medicare Advantage    May 15, 2018

## (undated) NOTE — IP AVS SNAPSHOT
[address]            (For Outpatient Use Only) Initial Admit Date: 6/20/2019   Inpt/Obs Admit Date: Inpt: 6/21/19 / Obs: N/A   Discharge Date:    Alyssa Brar:  [de-identified]   MRN: [de-identified]   CSN: [id number]   CEID: [id number] Subscriber ID:  Pt Rel to Subscriber:    Hospital Account Financial Class: Medicare Advantage    June 23, 2019